# Patient Record
Sex: FEMALE | Race: WHITE | Employment: OTHER | URBAN - METROPOLITAN AREA
[De-identification: names, ages, dates, MRNs, and addresses within clinical notes are randomized per-mention and may not be internally consistent; named-entity substitution may affect disease eponyms.]

---

## 2017-05-02 DIAGNOSIS — K21.9 GASTROESOPHAGEAL REFLUX DISEASE WITHOUT ESOPHAGITIS: ICD-10-CM

## 2017-05-02 NOTE — TELEPHONE ENCOUNTER
Omeprazole      Last Written Prescription Date: 05/09/16  Last Fill Quantity: 90,  # refills: 3   Last Office Visit with G, [facility] or [facility] prescribing provider: 05/09/16                                         Next 5 appointments (look out 90 days)     May 11, 2017  7:20 AM CDT   PHYSICAL with Mouna Chaves MD   [facility] ([facility])    [address] [place]  [facility] [id number] [phone]

## 2017-05-11 ENCOUNTER — OFFICE VISIT (OUTPATIENT)
Dept: INTERNAL MEDICINE | Facility: CLINIC | Age: 67
End: 2017-05-11
Payer: COMMERCIAL

## 2017-05-11 VITALS
WEIGHT: 198.3 LBS | OXYGEN SATURATION: 98 % | TEMPERATURE: 98.6 F | HEART RATE: 72 BPM | HEIGHT: 66 IN | DIASTOLIC BLOOD PRESSURE: 78 MMHG | SYSTOLIC BLOOD PRESSURE: 134 MMHG | BODY MASS INDEX: 31.87 KG/M2

## 2017-05-11 DIAGNOSIS — E03.9 ACQUIRED HYPOTHYROIDISM: ICD-10-CM

## 2017-05-11 DIAGNOSIS — K91.2 POSTSURGICAL MALABSORPTION, NOT ELSEWHERE CLASSIFIED: ICD-10-CM

## 2017-05-11 DIAGNOSIS — Z12.31 ENCOUNTER FOR SCREENING MAMMOGRAM FOR MALIGNANT NEOPLASM OF BREAST: ICD-10-CM

## 2017-05-11 DIAGNOSIS — K21.9 GASTROESOPHAGEAL REFLUX DISEASE WITHOUT ESOPHAGITIS: ICD-10-CM

## 2017-05-11 DIAGNOSIS — Z98.84 BARIATRIC SURGERY STATUS: ICD-10-CM

## 2017-05-11 DIAGNOSIS — I10 ESSENTIAL HYPERTENSION: ICD-10-CM

## 2017-05-11 DIAGNOSIS — E21.3 HYPERPARATHYROIDISM (H): Primary | ICD-10-CM

## 2017-05-11 DIAGNOSIS — Z00.00 ROUTINE GENERAL MEDICAL EXAMINATION AT A HEALTH CARE FACILITY: ICD-10-CM

## 2017-05-11 DIAGNOSIS — Z23 NEED FOR PNEUMOCOCCAL VACCINATION: ICD-10-CM

## 2017-05-11 DIAGNOSIS — M10.9 GOUT WITHOUT TOPHUS: ICD-10-CM

## 2017-05-11 LAB
ALBUMIN SERPL-MCNC: 3.8 G/DL (ref 3.4–5)
ALP SERPL-CCNC: 120 U/L (ref 40–150)
ALT SERPL W P-5'-P-CCNC: 32 U/L (ref 0–50)
ANION GAP SERPL CALCULATED.3IONS-SCNC: 5 MMOL/L (ref 3–14)
AST SERPL W P-5'-P-CCNC: 15 U/L (ref 0–45)
BILIRUB SERPL-MCNC: 0.7 MG/DL (ref 0.2–1.3)
BUN SERPL-MCNC: 25 MG/DL (ref 7–30)
CALCIUM SERPL-MCNC: 9.1 MG/DL (ref 8.5–10.1)
CHLORIDE SERPL-SCNC: 111 MMOL/L (ref 94–109)
CHOLEST SERPL-MCNC: 189 MG/DL
CO2 SERPL-SCNC: 28 MMOL/L (ref 20–32)
CREAT SERPL-MCNC: 1.38 MG/DL (ref 0.52–1.04)
ERYTHROCYTE [DISTWIDTH] IN BLOOD BY AUTOMATED COUNT: 13.1 % (ref 10–15)
GFR SERPL CREATININE-BSD FRML MDRD: 38 ML/MIN/1.7M2
GLUCOSE SERPL-MCNC: 94 MG/DL (ref 70–99)
HCT VFR BLD AUTO: 41.9 % (ref 35–47)
HDLC SERPL-MCNC: 60 MG/DL
HGB BLD-MCNC: 13.7 G/DL (ref 11.7–15.7)
LDLC SERPL CALC-MCNC: 113 MG/DL
MCH RBC QN AUTO: 32.9 PG (ref 26.5–33)
MCHC RBC AUTO-ENTMCNC: 32.7 G/DL (ref 31.5–36.5)
MCV RBC AUTO: 101 FL (ref 78–100)
NONHDLC SERPL-MCNC: 129 MG/DL
PLATELET # BLD AUTO: 199 10E9/L (ref 150–450)
POTASSIUM SERPL-SCNC: 4.3 MMOL/L (ref 3.4–5.3)
PROT SERPL-MCNC: 7.4 G/DL (ref 6.8–8.8)
RBC # BLD AUTO: 4.17 10E12/L (ref 3.8–5.2)
SODIUM SERPL-SCNC: 144 MMOL/L (ref 133–144)
TRIGL SERPL-MCNC: 82 MG/DL
TSH SERPL DL<=0.005 MIU/L-ACNC: 1.99 MU/L (ref 0.4–4)
WBC # BLD AUTO: 5.8 10E9/L (ref 4–11)

## 2017-05-11 PROCEDURE — 86803 HEPATITIS C AB TEST: CPT | Performed by: INTERNAL MEDICINE

## 2017-05-11 PROCEDURE — 80061 LIPID PANEL: CPT | Performed by: INTERNAL MEDICINE

## 2017-05-11 PROCEDURE — G0009 ADMIN PNEUMOCOCCAL VACCINE: HCPCS | Performed by: INTERNAL MEDICINE

## 2017-05-11 PROCEDURE — 90732 PPSV23 VACC 2 YRS+ SUBQ/IM: CPT | Performed by: INTERNAL MEDICINE

## 2017-05-11 PROCEDURE — 85027 COMPLETE CBC AUTOMATED: CPT | Performed by: INTERNAL MEDICINE

## 2017-05-11 PROCEDURE — 80053 COMPREHEN METABOLIC PANEL: CPT | Performed by: INTERNAL MEDICINE

## 2017-05-11 PROCEDURE — G0438 PPPS, INITIAL VISIT: HCPCS | Performed by: INTERNAL MEDICINE

## 2017-05-11 PROCEDURE — 84443 ASSAY THYROID STIM HORMONE: CPT | Performed by: INTERNAL MEDICINE

## 2017-05-11 PROCEDURE — 36415 COLL VENOUS BLD VENIPUNCTURE: CPT | Performed by: INTERNAL MEDICINE

## 2017-05-11 RX ORDER — CYANOCOBALAMIN 1000 UG/ML
INJECTION, SOLUTION INTRAMUSCULAR; SUBCUTANEOUS
Qty: 3 ML | Refills: 3 | Status: CANCELLED | OUTPATIENT
Start: 2017-05-11

## 2017-05-11 RX ORDER — CYANOCOBALAMIN 1000 UG/ML
INJECTION, SOLUTION INTRAMUSCULAR; SUBCUTANEOUS
Qty: 3 ML | Refills: 1 | Status: SHIPPED | OUTPATIENT
Start: 2017-05-11 | End: 2018-02-06

## 2017-05-11 RX ORDER — ALLOPURINOL 100 MG/1
200 TABLET ORAL DAILY
Qty: 180 TABLET | Refills: 1 | Status: SHIPPED | OUTPATIENT
Start: 2017-05-11 | End: 2018-01-03

## 2017-05-11 RX ORDER — LEVOTHYROXINE SODIUM 100 UG/1
100 TABLET ORAL DAILY
Qty: 90 TABLET | Refills: 3 | Status: SHIPPED | OUTPATIENT
Start: 2017-05-11 | End: 2018-05-06

## 2017-05-11 NOTE — PROGRESS NOTES
SUBJECTIVE:                                                            Amy Haq is a 66 year old female who presents for Preventive Visit.    Fasting physical.  Last pap hyst. Last mammo 5-2016 wnl. Last Dexa 5-2016 osteopenia.  Last colonoscopy 4-2013 polyps.    Are you in the first 12 months of your Medicare coverage?  No    Physical   Annual:     Getting at least 3 servings of Calcium per day::  NO    Bi-annual eye exam::  Yes    Dental care twice a year::  Yes    Sleep apnea or symptoms of sleep apnea::  None    Frequency of exercise::  4-5 days/week    Duration of exercise::  45-60 minutes    Taking medications regularly::  Yes    Medication side effects::  None    Additional concerns today::  YES      COGNITIVE SCREEN  1) Repeat 3 items (Banana, Sunrise, Chair)    2) Clock draw: NORMAL  3) 3 item recall: Recalls 3 objects  Results: 3 items recalled: COGNITIVE IMPAIRMENT LESS LIKELY    Mini-CogTM Copyright S Vandana. Licensed by the author for use in [facility]; reprinted with permission (hany@[facility]). All rights reserved.        Reviewed and updated as needed this visit by clinical staff  Tobacco  Allergies  Meds  Med Hx  Surg Hx  Fam Hx  Soc Hx        Reviewed and updated as needed this visit by Provider        Social History   Substance Use Topics     Smoking status: Former Smoker     Packs/day: 0.50     Years: 35.00     Types: Cigarettes     Quit date: 9/26/2006     Smokeless tobacco: Never Used     Alcohol use Yes      Comment: occasionally       The patient does not drink >3 drinks per day nor >7 drinks per week.        Today's PHQ-2 Score:   PHQ-2 ( 1999 [employer]) 5/8/2017   Q1: Little interest or pleasure in doing things -   Q2: Feeling down, depressed or hopeless -   PHQ-2 Score -   Little interest or pleasure in doing things Not at all   Feeling down, depressed or hopeless Not at all   PHQ-2 Score 0       Do you feel safe in your environment - Yes    Do you have a Health Care  Directive?: No: Advance care planning reviewed with patient; information given to patient to review.    Current providers sharing in care for this patient include:   Patient Care Team:  Mouna Chaves MD as PCP - General      Hearing impairment: No    Ability to successfully perform activities of daily living: Yes, no assistance needed     Fall risk:  Fallen 2 or more times in the past year?: No  Any fall with injury in the past year?: No    Home safety:  none identified      The following health maintenance items are reviewed in Epic and correct as of today:  Health Maintenance   Topic Date Due     HEPATITIS C SCREENING  08/10/1968     PNEUMOCOCCAL (2 of 2 - PPSV23) 10/12/2016     ADVANCE DIRECTIVE PLANNING Q5 YRS (NO INBASKET)  02/16/2017     TSH Q1 YEAR (NO INBASKET)  05/09/2017     FALL RISK ASSESSMENT  05/09/2017     INFLUENZA VACCINE (SYSTEM ASSIGNED)  09/01/2017     MAMMO SCREEN Q2 YR (SYSTEM ASSIGNED)  05/25/2018     TETANUS IMMUNIZATION (SYSTEM ASSIGNED)  10/30/2018     DEXA Q3 YR  05/25/2019     LIPID SCREEN Q5 YR FEMALE (SYSTEM ASSIGNED)  05/09/2021     COLON CANCER SCREEN (SYSTEM ASSIGNED)  04/30/2023              ROS:  C: NEGATIVE for fever, chills, change in weight  I: NEGATIVE for worrisome rashes, moles or lesions  E: NEGATIVE for vision changes or irritation  E/M: NEGATIVE for ear, mouth and throat problems  R: NEGATIVE for significant cough or SOB  B: NEGATIVE for masses, tenderness or discharge  CV: NEGATIVE for chest pain, palpitations or peripheral edema  GI: NEGATIVE for nausea, abdominal pain, heartburn, or change in bowel habits  : NEGATIVE for frequency, dysuria, or hematuria  M: NEGATIVE for significant arthralgias or myalgia  N: NEGATIVE for weakness, dizziness or paresthesias  E: NEGATIVE for temperature intolerance, skin/hair changes  H: NEGATIVE for bleeding problems  P: NEGATIVE for changes in mood or affect    Problem list, Medication list, Allergies, and  "Medical/Social/Surgical histories reviewed in [facility] and updated as appropriate.  OBJECTIVE:                                                            /78 (BP Location: Right arm, Patient Position: Chair, Cuff Size: Adult Large)  Pulse 72  Temp 98.6  F (37  C) (Oral)  Ht 5' 5.75\" (1.67 m)  Wt 198 lb 4.8 oz (89.9 kg)  SpO2 98%  Breastfeeding? No  BMI 32.25 kg/m2 Estimated body mass index is 32.25 kg/(m^2) as calculated from the following:    Height as of this encounter: 5' 5.75\" (1.67 m).    Weight as of this encounter: 198 lb 4.8 oz (89.9 kg).  EXAM:   GENERAL: healthy, alert and no distress  EYES: Eyes grossly normal to inspection, PERRL and conjunctivae and sclerae normal  HENT: ear canals and TM's normal, nose and mouth without ulcers or lesions  NECK: no adenopathy, no asymmetry, masses, or scars and thyroid normal to palpation  RESP: lungs clear to auscultation - no rales, rhonchi or wheezes  BREAST: normal without masses, tenderness or nipple discharge and no palpable axillary masses or adenopathy  CV: regular rate and rhythm, normal S1 S2, no S3 or S4, no murmur, click or rub, no peripheral edema and peripheral pulses strong  ABDOMEN: soft, nontender, no hepatosplenomegaly, no masses and bowel sounds normal  MS: no gross musculoskeletal defects noted, no edema  SKIN: no suspicious lesions or rashes  NEURO: Normal strength and tone, mentation intact and speech normal  PSYCH: mentation appears normal, affect normal/bright    ASSESSMENT / PLAN:                                                            1. Routine general medical examination at a health care facility     - CBC with platelets  - Comprehensive metabolic panel (BMP + Alb, Alk Phos, ALT, AST, Total. Bili, TP)  - TSH with free T4 reflex  - Lipid Profile with reflex to direct LDL  - Hepatitis C Screen Reflex to HCV RNA Quant and Genotype  - *MA Screening Digital Bilateral; Future    2. Hyperparathyroidism (H)       3. Acquired " "hypothyroidism     - levothyroxine (LEVOTHROID) 100 MCG tablet; Take 1 tablet (100 mcg) by mouth daily  Dispense: 90 tablet; Refill: 3    4. Bariatric surgery status     - syringe/needle, disp, 25G X 1\" 3 ML MISC; Use for B-12 injection  Dispense: 3 each; Refill: 3  - ranitidine (ZANTAC) 150 MG tablet; Take 1 tablet (150 mg) by mouth 2 times daily  Dispense: 180 tablet; Refill: 3    5. Essential hypertension       6. Postsurgical malabsorption, not elsewhere classified     - cyanocobalamin (VITAMIN B12) 1000 MCG/ML injection; INJECT 1 ML (1,000 MCG) SUBCUTANEOUS EVERY 45 days  Dispense: 3 mL; Refill: 1    7. Gastroesophageal reflux disease without esophagitis     - ranitidine (ZANTAC) 150 MG tablet; Take 1 tablet (150 mg) by mouth 2 times daily  Dispense: 180 tablet; Refill: 3    8. Gout without tophus     - allopurinol (ZYLOPRIM) 100 MG tablet; Take 2 tablets (200 mg) by mouth daily  Dispense: 180 tablet; Refill: 1    9. Encounter for screening mammogram for malignant neoplasm of breast      - *MA Screening Digital Bilateral; Future    End of Life Planning:  Patient currently has an advanced directive: No.  I have verified the patient's ablity to prepare an advanced directive/make health care decisions.  Literature was provided to assist patient in preparing an advanced directive.    COUNSELING:  Reviewed preventive health counseling, as reflected in patient instructions       Regular exercise       Healthy diet/nutrition    BP Screening:   Last 3 BP Readings:    BP Readings from Last 3 Encounters:   05/11/17 134/78   12/07/16 153/84   05/09/16 144/78       The following was recommended to the patient:  Re-screen BP within a year and recommended lifestyle modifications    Estimated body mass index is 32.25 kg/(m^2) as calculated from the following:    Height as of this encounter: 5' 5.75\" (1.67 m).    Weight as of this encounter: 198 lb 4.8 oz (89.9 kg).  Weight management plan: Discussed healthy diet and exercise " guidelines and patient will follow up in 12 months in clinic to re-evaluate.   reports that she quit smoking about 10 years ago. Her smoking use included Cigarettes. She has a 17.50 pack-year smoking history. She has never used smokeless tobacco.      Appropriate preventive services were discussed with this patient, including applicable screening as appropriate for cardiovascular disease, diabetes, osteopenia/osteoporosis, and glaucoma.  As appropriate for age/gender, discussed screening for colorectal cancer, prostate cancer, breast cancer, and cervical cancer. Checklist reviewing preventive services available has been given to the patient.    Reviewed patients plan of care and provided an AVS. The Basic Care Plan (routine screening as documented in Health Maintenance) for Amy meets the Care Plan requirement. This Care Plan has been established and reviewed with the Patient.    Counseling Resources:  ATP IV Guidelines  Pooled Cohorts Equation Calculator  Breast Cancer Risk Calculator  FRAX Risk Assessment  ICSI Preventive Guidelines  Dietary Guidelines for Americans, 2010  [employer]'s MyPlate  ASA Prophylaxis  Lung CA Screening    Mouna Chaves MD  [facility]  Answers for HPI/ROS submitted by the patient on 5/8/2017   Q1: Little interest or pleasure in doing things: 0=Not at all  Q2: Feeling down, depressed or hopeless: 0=Not at all  PHQ-2 Score: 0

## 2017-05-11 NOTE — MR AVS SNAPSHOT
After Visit Summary   5/11/2017    Amy Haq    MRN: [id number]           Patient Information     Date Of Birth          1950        Visit Information        Provider Department      5/11/2017 7:20 AM Mouna Chaves MD [facility]        Today's Diagnoses     Hyperparathyroidism (H)    -  1    Routine general medical examination at a health care facility        Acquired hypothyroidism        Bariatric surgery status        Essential hypertension        Postsurgical malabsorption, not elsewhere classified        Gastroesophageal reflux disease without esophagitis        Gout without tophus        Encounter for screening mammogram for malignant neoplasm of breast            Follow-ups after your visit        Future tests that were ordered for you today     Open Future Orders        Priority Expected Expires Ordered    *MA Screening Digital Bilateral Routine  5/11/2018 5/11/2017            Who to contact     If you have questions or need follow up information about today's clinic visit or your schedule please contact [facility] directly at [phone].  Normal or non-critical lab and imaging results will be communicated to you by MyChart, letter or phone within 4 business days after the clinic has received the results. If you do not hear from us within 7 days, please contact the clinic through [employer] or phone. If you have a critical or abnormal lab result, we will notify you by phone as soon as possible.  Submit refill requests through [employer] or call your pharmacy and they will forward the refill request to us. Please allow 3 business days for your refill to be completed.          Additional Information About Your Visit        MyChart Information     [employer] gives you secure access to your electronic health record. If you see a primary care provider, you can also send messages to your care team and make appointments. If you have questions, please call  "your primary care clinic.  If you do not have a primary care provider, please call [phone] and they will assist you.        Care EveryWhere ID     This is your Care EveryWhere ID. This could be used by other organizations to access your [place] medical records  [id number]        Your Vitals Were     Pulse Temperature Height Pulse Oximetry Breastfeeding? BMI (Body Mass Index)    72 98.6  F (37  C) (Oral) 5' 5.75\" (1.67 m) 98% No 32.25 kg/m2       Blood Pressure from Last 3 Encounters:   05/11/17 134/78   12/07/16 153/84   05/09/16 144/78    Weight from Last 3 Encounters:   05/11/17 198 lb 4.8 oz (89.9 kg)   12/07/16 194 lb 8 oz (88.2 kg)   05/09/16 190 lb (86.2 kg)              We Performed the Following     CBC with platelets     Comprehensive metabolic panel (BMP + Alb, Alk Phos, ALT, AST, Total. Bili, TP)     Hepatitis C Screen Reflex to HCV RNA Quant and Genotype     Lipid Profile with reflex to direct LDL     TSH with free T4 reflex          Today's Medication Changes          These changes are accurate as of: 5/11/17  8:03 AM.  If you have any questions, ask your nurse or doctor.               These medicines have changed or have updated prescriptions.        Dose/Directions    cyanocobalamin 1000 MCG/ML injection   Commonly known as:  VITAMIN B12   This may have changed:  See the new instructions.   Used for:  Postsurgical malabsorption, not elsewhere classified   Changed by:  Mouna Chaves MD        INJECT 1 ML (1,000 MCG) SUBCUTANEOUS EVERY 45 days   Quantity:  3 mL   Refills:  1            Where to get your medicines      These medications were sent to [id number] - [place], [place] - [address] [address] [address]     Phone:  [phone]     allopurinol 100 MG tablet    cyanocobalamin 1000 MCG/ML injection    levothyroxine 100 MCG tablet    ranitidine 150 MG tablet    syringe/needle (disp) 25G X 1\" 3 ML Misc                Primary Care Provider Office Phone " "# Fax #    Mouna Chaves [phone]       [facility] [address]   [facility] [address]        Thank you!     Thank you for choosing [facility]  for your care. Our goal is always to provide you with excellent care. Hearing back from our patients is one way we can continue to improve our services. Please take a few minutes to complete the written survey that you may receive in the mail after your visit with us. Thank you!             Your Updated Medication List - Protect others around you: Learn how to safely use, store and throw away your medicines at www.disposemymeds.org.          This list is accurate as of: 5/11/17  8:03 AM.  Always use your most recent med list.                   Brand Name Dispense Instructions for use    allopurinol 100 MG tablet    ZYLOPRIM    180 tablet    Take 2 tablets (200 mg) by mouth daily       ASPIR-LOW PO      Take 81 mg by mouth daily       CALCIUM + D PO      Take 2 tablets by mouth daily 600mg + vitamin D       cetirizine 10 MG tablet    zyrTEC     Take 10 mg by mouth daily.       cyanocobalamin 1000 MCG/ML injection    VITAMIN B12    3 mL    INJECT 1 ML (1,000 MCG) SUBCUTANEOUS EVERY 45 days       levothyroxine 100 MCG tablet    LEVOTHROID    90 tablet    Take 1 tablet (100 mcg) by mouth daily       * Multi-vitamin Tabs tablet      Take 1 tablet by mouth daily       * ICAPS AREDS FORMULA PO      Take 1 capsule by mouth daily       nystatin-triamcinolone cream    MYCOLOG II    30 g    Apply to affected area twice daily up to 7 days as needed for rash       ranitidine 150 MG tablet    ZANTAC    180 tablet    Take 1 tablet (150 mg) by mouth 2 times daily       syringe/needle (disp) 25G X 1\" 3 ML Misc     3 each    Use for B-12 injection       VITAMIN D (CHOLECALCIFEROL) PO      Take 2,000 Units by mouth daily       * Notice:  This list has 2 medication(s) that are the same as other medications prescribed for you. " Read the directions carefully, and ask your doctor or other care provider to review them with you.

## 2017-05-11 NOTE — NURSING NOTE
"Chief Complaint   Patient presents with     Medicare Visit       Initial /78 (BP Location: Right arm, Patient Position: Chair, Cuff Size: Adult Large)  Pulse 72  Temp 98.6  F (37  C) (Oral)  Ht 5' 5.75\" (1.67 m)  Wt 198 lb 4.8 oz (89.9 kg)  SpO2 98%  Breastfeeding? No  BMI 32.25 kg/m2 Estimated body mass index is 32.25 kg/(m^2) as calculated from the following:    Height as of this encounter: 5' 5.75\" (1.67 m).    Weight as of this encounter: 198 lb 4.8 oz (89.9 kg).  Medication Reconciliation: complete    "

## 2017-05-15 LAB — HCV AB SERPL QL IA: NORMAL

## 2017-10-19 ENCOUNTER — E-VISIT (OUTPATIENT)
Dept: INTERNAL MEDICINE | Facility: CLINIC | Age: 67
End: 2017-10-19
Payer: COMMERCIAL

## 2017-10-19 DIAGNOSIS — N30.00 ACUTE CYSTITIS WITHOUT HEMATURIA: Primary | ICD-10-CM

## 2017-10-19 DIAGNOSIS — N18.30 CKD (CHRONIC KIDNEY DISEASE) STAGE 3, GFR 30-59 ML/MIN (H): ICD-10-CM

## 2017-10-19 DIAGNOSIS — N30.00 ACUTE CYSTITIS WITHOUT HEMATURIA: ICD-10-CM

## 2017-10-19 LAB
ALBUMIN UR-MCNC: 30 MG/DL
APPEARANCE UR: CLEAR
BILIRUB UR QL STRIP: NEGATIVE
COLOR UR AUTO: YELLOW
GLUCOSE UR STRIP-MCNC: NEGATIVE MG/DL
HGB UR QL STRIP: ABNORMAL
KETONES UR STRIP-MCNC: NEGATIVE MG/DL
LEUKOCYTE ESTERASE UR QL STRIP: ABNORMAL
NITRATE UR QL: NEGATIVE
PH UR STRIP: 5.5 PH (ref 5–7)
RBC #/AREA URNS AUTO: ABNORMAL /HPF
SOURCE: ABNORMAL
SP GR UR STRIP: 1.01 (ref 1–1.03)
UROBILINOGEN UR STRIP-ACNC: 0.2 EU/DL (ref 0.2–1)
WBC #/AREA URNS AUTO: ABNORMAL /HPF

## 2017-10-19 PROCEDURE — 87186 SC STD MICRODIL/AGAR DIL: CPT | Mod: 91 | Performed by: INTERNAL MEDICINE

## 2017-10-19 PROCEDURE — 87086 URINE CULTURE/COLONY COUNT: CPT | Performed by: INTERNAL MEDICINE

## 2017-10-19 PROCEDURE — 99444 ZZC PHYSICIAN ONLINE EVALUATION & MANAGEMENT SERVICE: CPT | Performed by: INTERNAL MEDICINE

## 2017-10-19 PROCEDURE — 81001 URINALYSIS AUTO W/SCOPE: CPT | Performed by: INTERNAL MEDICINE

## 2017-10-19 PROCEDURE — 87088 URINE BACTERIA CULTURE: CPT | Performed by: INTERNAL MEDICINE

## 2017-10-19 RX ORDER — CIPROFLOXACIN 250 MG/1
250 TABLET, FILM COATED ORAL 2 TIMES DAILY
Qty: 6 TABLET | Refills: 0 | Status: SHIPPED | OUTPATIENT
Start: 2017-10-19 | End: 2018-05-16

## 2017-10-19 NOTE — TELEPHONE ENCOUNTER
Creatinine   Date Value Ref Range Status   05/11/2017 1.38 (H) 0.52 - 1.04 mg/dL Final       Cipro 250 bid

## 2017-10-19 NOTE — MR AVS SNAPSHOT
After Visit Summary   10/19/2017    Amy Haq    MRN: [id number]           Patient Information     Date Of Birth          1950        Visit Information        Provider Department      10/19/2017 8:49 AM Mouna Chaves MD [facility]        Today's Diagnoses     Acute cystitis without hematuria    -  1    CKD (chronic kidney disease) stage 3, GFR 30-59 ml/min           Follow-ups after your visit        Future tests that were ordered for you today     Open Future Orders        Priority Expected Expires Ordered    UA with Microscopic reflex to Culture Routine  10/19/2018 10/19/2017            Who to contact     If you have questions or need follow up information about today's clinic visit or your schedule please contact [facility] directly at [phone].  Normal or non-critical lab and imaging results will be communicated to you by MyChart, letter or phone within 4 business days after the clinic has received the results. If you do not hear from us within 7 days, please contact the clinic through [employer] or phone. If you have a critical or abnormal lab result, we will notify you by phone as soon as possible.  Submit refill requests through [employer] or call your pharmacy and they will forward the refill request to us. Please allow 3 business days for your refill to be completed.          Additional Information About Your Visit        MyChart Information     [employer] gives you secure access to your electronic health record. If you see a primary care provider, you can also send messages to your care team and make appointments. If you have questions, please call your primary care clinic.  If you do not have a primary care provider, please call [phone] and they will assist you.        Care EveryWhere ID     This is your Care EveryWhere ID. This could be used by other organizations to access your [place] medical records  [id number]         Blood Pressure  from Last 3 Encounters:   05/11/17 134/78   12/07/16 153/84   05/09/16 144/78    Weight from Last 3 Encounters:   05/11/17 198 lb 4.8 oz (89.9 kg)   12/07/16 194 lb 8 oz (88.2 kg)   05/09/16 190 lb (86.2 kg)                 Today's Medication Changes          These changes are accurate as of: 10/19/17  9:13 AM.  If you have any questions, ask your nurse or doctor.               Start taking these medicines.        Dose/Directions    ciprofloxacin 250 MG tablet   Commonly known as:  CIPRO   Used for:  Acute cystitis without hematuria, CKD (chronic kidney disease) stage 3, GFR 30-59 ml/min        Dose:  250 mg   Take 1 tablet (250 mg) by mouth 2 times daily   Quantity:  6 tablet   Refills:  0            Where to get your medicines      These medications were sent to [id number] - [facility] [address] [address] [address]     Phone:  [phone]     ciprofloxacin 250 MG tablet                Primary Care Provider Office Phone # Fax #    Mouna Chaves [phone]       [address] [facility] [address]  [facility] [address]        Equal Access to Services     HONORIO BELL AH: Hadii franca jean-baptiste hadasho Soomaali, waaxda luqadaha, qaybta kaalmada adeegyada, iris rico. So [facility] [phone].    ATENCIÓN: Si habla español, tiene a luis disposición servicios gratuitos de asistencia lingüística. Fishame al [phone].    We comply with applicable federal civil rights laws and [place] laws. We do not discriminate on the basis of race, color, national origin, age, disability, sex, sexual orientation, or gender identity.            Thank you!     Thank you for choosing [facility]  for your care. Our goal is always to provide you with excellent care. Hearing back from our patients is one way we can continue to improve our services. Please take a few minutes to complete the written survey that you may receive in the mail after your  "visit with us. Thank you!             Your Updated Medication List - Protect others around you: Learn how to safely use, store and throw away your medicines at www.disposemymeds.org.          This list is accurate as of: 10/19/17  9:13 AM.  Always use your most recent med list.                   Brand Name Dispense Instructions for use Diagnosis    allopurinol 100 MG tablet    ZYLOPRIM    180 tablet    Take 2 tablets (200 mg) by mouth daily    Gout without tophus       ASPIR-LOW PO      Take 81 mg by mouth daily    Status post bariatric surgery       CALCIUM + D PO      Take 2 tablets by mouth daily 600mg + vitamin D        cetirizine 10 MG tablet    zyrTEC     Take 10 mg by mouth daily.        ciprofloxacin 250 MG tablet    CIPRO    6 tablet    Take 1 tablet (250 mg) by mouth 2 times daily    Acute cystitis without hematuria, CKD (chronic kidney disease) stage 3, GFR 30-59 ml/min       cyanocobalamin 1000 MCG/ML injection    VITAMIN B12    3 mL    INJECT 1 ML (1,000 MCG) SUBCUTANEOUS EVERY 45 days    Postsurgical malabsorption, not elsewhere classified       levothyroxine 100 MCG tablet    LEVOTHROID    90 tablet    Take 1 tablet (100 mcg) by mouth daily    Acquired hypothyroidism       * Multi-vitamin Tabs tablet      Take 1 tablet by mouth daily        * ICAPS AREDS FORMULA PO      Take 1 capsule by mouth daily    Bariatric surgery status, Obesity (BMI 30.0-34.9)       nystatin-triamcinolone cream    MYCOLOG II    30 g    Apply to affected area twice daily up to 7 days as needed for rash    Bariatric surgery status, Postoperative malabsorption, Intertrigo       ranitidine 150 MG tablet    ZANTAC    180 tablet    Take 1 tablet (150 mg) by mouth 2 times daily    Bariatric surgery status, Gastroesophageal reflux disease without esophagitis       syringe/needle (disp) 25G X 1\" 3 ML Misc     3 each    Use for B-12 injection    Bariatric surgery status       VITAMIN D (CHOLECALCIFEROL) PO      Take 2,000 Units by mouth " daily    Bariatric surgery status, Obesity, unspecified       * Notice:  This list has 2 medication(s) that are the same as other medications prescribed for you. Read the directions carefully, and ask your doctor or other care provider to review them with you.

## 2017-10-22 LAB
BACTERIA SPEC CULT: ABNORMAL
BACTERIA SPEC CULT: ABNORMAL
SPECIMEN SOURCE: ABNORMAL

## 2018-01-03 DIAGNOSIS — M10.9 GOUT WITHOUT TOPHUS: ICD-10-CM

## 2018-01-06 NOTE — TELEPHONE ENCOUNTER
Requested Prescriptions   Pending Prescriptions Disp Refills     allopurinol (ZYLOPRIM) 100 MG tablet [Pharmacy Med Name: Allopurinol Oral Tablet 100 MG] 180 tablet 0     Sig: TAKE TWO TABLETS BY MOUTH DAILY    Gout Agents Protocol Failed    1/3/2018 11:41 AM       Failed - Normal serum creatinine on file in the past 12 months    Recent Labs   Lab Test  05/11/17 0817   CR  1.38*            Passed - CBC on file in past 12 months    Recent Labs   Lab Test  05/11/17 0817   WBC  5.8   RBC  4.17   HGB  13.7   HCT  41.9   PLT  199            Passed - ALT on file in past 12 months    Recent Labs   Lab Test  05/11/17 0817   ALT  32            Passed - Uric acid greater than or equal to 6 on file in past 12 months    Recent Labs   Lab Test  05/26/11   0821   URIC  7.5     If level is 6mg/dL or greater, ok to refill one time and refer to provider.          Passed - Recent or future visit with authorizing provider's specialty    Patient had office visit in the last year or has a visit in the next 30 days with authorizing provider.  See chart review.              Passed - Patient is age 18 or older       Passed - No active pregnancy on record       Passed - No positive pregnancy test in past year      Routing refill request to provider for review/approval because:  Labs out of range:  Cr

## 2018-01-08 RX ORDER — ALLOPURINOL 100 MG/1
TABLET ORAL
Qty: 180 TABLET | Refills: 0 | Status: SHIPPED | OUTPATIENT
Start: 2018-01-08 | End: 2018-04-10

## 2018-02-06 DIAGNOSIS — K91.2 POSTOPERATIVE MALABSORPTION: Primary | ICD-10-CM

## 2018-02-12 RX ORDER — CYANOCOBALAMIN 1000 UG/ML
INJECTION, SOLUTION INTRAMUSCULAR; SUBCUTANEOUS
Qty: 2 ML | Refills: 0 | Status: SHIPPED | OUTPATIENT
Start: 2018-02-12 | End: 2018-05-16

## 2018-04-10 DIAGNOSIS — M10.9 GOUT WITHOUT TOPHUS: ICD-10-CM

## 2018-04-11 NOTE — TELEPHONE ENCOUNTER
"Requested Prescriptions   Pending Prescriptions Disp Refills     allopurinol (ZYLOPRIM) 100 MG tablet [Pharmacy Med Name: Allopurinol Oral Tablet 100 MG] 180 tablet 0     Sig: TAKE TWO TABLETS BY MOUTH DAILY    Gout Agents Protocol Failed    4/10/2018 10:35 AM       Failed - Uric acid greater than or equal to 6 on file in past 12 months    Recent Labs   Lab Test  05/26/11   0821   URIC  7.5     If level is 6mg/dL or greater, ok to refill one time and refer to provider.          Failed - Normal serum creatinine on file in the past 12 months    Recent Labs   Lab Test  05/11/17   0817   CR  1.38*            Passed - CBC on file in past 12 months    Recent Labs   Lab Test  05/11/17 0817   WBC  5.8   RBC  4.17   HGB  13.7   HCT  41.9   PLT  199            Passed - ALT on file in past 12 months    Recent Labs   Lab Test  05/11/17 0817   ALT  32            Passed - Recent (12 mo) or future (30 days) visit within the authorizing provider's specialty    Patient had office visit in the last 12 months or has a visit in the next 30 days with authorizing provider or within the authorizing provider's specialty.  See \"Patient Info\" tab in inbasket, or \"Choose Columns\" in Meds & Orders section of the refill encounter.           Passed - Patient is age 18 or older       Passed - No active pregnancy on record       Passed - No positive pregnancy test in past year        Routing refill request to provider for review/approval because:  Labs not current:  Uric acid last done 2011        "

## 2018-04-13 RX ORDER — ALLOPURINOL 100 MG/1
TABLET ORAL
Qty: 180 TABLET | Refills: 0 | Status: SHIPPED | OUTPATIENT
Start: 2018-04-13 | End: 2018-05-16

## 2018-04-14 DIAGNOSIS — Z98.84 BARIATRIC SURGERY STATUS: ICD-10-CM

## 2018-04-14 DIAGNOSIS — K21.9 GASTROESOPHAGEAL REFLUX DISEASE WITHOUT ESOPHAGITIS: ICD-10-CM

## 2018-05-06 DIAGNOSIS — E03.9 ACQUIRED HYPOTHYROIDISM: ICD-10-CM

## 2018-05-08 RX ORDER — LEVOTHYROXINE SODIUM 100 UG/1
TABLET ORAL
Qty: 90 TABLET | Refills: 0 | Status: SHIPPED | OUTPATIENT
Start: 2018-05-08 | End: 2018-05-18

## 2018-05-08 NOTE — TELEPHONE ENCOUNTER
"        Requested Prescriptions   Pending Prescriptions Disp Refills     levothyroxine (SYNTHROID/LEVOTHROID) 100 MCG tablet [Pharmacy Med Name: Levothyroxine Sodium Oral Tablet 100 MCG] 90 tablet 2     Sig: TAKE ONE TABLET BY MOUTH ONE TIME DAILY    Thyroid Protocol Passed    5/6/2018  7:01 AM       Passed - Patient is 12 years or older       Passed - Recent (12 mo) or future (30 days) visit within the authorizing provider's specialty    Patient had office visit in the last 12 months or has a visit in the next 30 days with authorizing provider or within the authorizing provider's specialty.  See \"Patient Info\" tab in inbasket, or \"Choose Columns\" in Meds & Orders section of the refill encounter.           Passed - Normal TSH on file in past 12 months    Recent Labs   Lab Test  05/11/17   0817   TSH  1.99             Passed - No active pregnancy on record    If patient is pregnant or has had a positive pregnancy test, please check TSH.         Passed - No positive pregnancy test in past 12 months    If patient is pregnant or has had a positive pregnancy test, please check TSH.            "

## 2018-05-16 ENCOUNTER — TELEPHONE (OUTPATIENT)
Dept: INTERNAL MEDICINE | Facility: CLINIC | Age: 68
End: 2018-05-16

## 2018-05-16 ENCOUNTER — OFFICE VISIT (OUTPATIENT)
Dept: INTERNAL MEDICINE | Facility: CLINIC | Age: 68
End: 2018-05-16
Payer: COMMERCIAL

## 2018-05-16 VITALS
HEART RATE: 67 BPM | OXYGEN SATURATION: 98 % | SYSTOLIC BLOOD PRESSURE: 128 MMHG | BODY MASS INDEX: 33.11 KG/M2 | DIASTOLIC BLOOD PRESSURE: 64 MMHG | TEMPERATURE: 98 F | WEIGHT: 206 LBS | RESPIRATION RATE: 12 BRPM | HEIGHT: 66 IN

## 2018-05-16 DIAGNOSIS — Z12.11 SPECIAL SCREENING FOR MALIGNANT NEOPLASMS, COLON: ICD-10-CM

## 2018-05-16 DIAGNOSIS — Z00.01 ENCOUNTER FOR GENERAL ADULT MEDICAL EXAMINATION WITH ABNORMAL FINDINGS: Primary | ICD-10-CM

## 2018-05-16 DIAGNOSIS — E55.9 VITAMIN D DEFICIENCY: ICD-10-CM

## 2018-05-16 DIAGNOSIS — M10.9 GOUT WITHOUT TOPHUS: ICD-10-CM

## 2018-05-16 DIAGNOSIS — K91.2 POSTOPERATIVE MALABSORPTION: ICD-10-CM

## 2018-05-16 DIAGNOSIS — Z12.31 ENCOUNTER FOR SCREENING MAMMOGRAM FOR BREAST CANCER: ICD-10-CM

## 2018-05-16 DIAGNOSIS — Z98.84 BARIATRIC SURGERY STATUS: ICD-10-CM

## 2018-05-16 DIAGNOSIS — K21.9 GASTROESOPHAGEAL REFLUX DISEASE WITHOUT ESOPHAGITIS: ICD-10-CM

## 2018-05-16 DIAGNOSIS — E03.9 ACQUIRED HYPOTHYROIDISM: ICD-10-CM

## 2018-05-16 DIAGNOSIS — E03.9 HYPOTHYROIDISM, UNSPECIFIED TYPE: ICD-10-CM

## 2018-05-16 LAB
BASOPHILS # BLD AUTO: 0 10E9/L (ref 0–0.2)
BASOPHILS NFR BLD AUTO: 0.5 %
DIFFERENTIAL METHOD BLD: NORMAL
EOSINOPHIL # BLD AUTO: 0.1 10E9/L (ref 0–0.7)
EOSINOPHIL NFR BLD AUTO: 1.4 %
ERYTHROCYTE [DISTWIDTH] IN BLOOD BY AUTOMATED COUNT: 13.5 % (ref 10–15)
HCT VFR BLD AUTO: 39.6 % (ref 35–47)
HGB BLD-MCNC: 13 G/DL (ref 11.7–15.7)
LYMPHOCYTES # BLD AUTO: 1.4 10E9/L (ref 0.8–5.3)
LYMPHOCYTES NFR BLD AUTO: 21.8 %
MCH RBC QN AUTO: 32.5 PG (ref 26.5–33)
MCHC RBC AUTO-ENTMCNC: 32.8 G/DL (ref 31.5–36.5)
MCV RBC AUTO: 99 FL (ref 78–100)
MONOCYTES # BLD AUTO: 0.5 10E9/L (ref 0–1.3)
MONOCYTES NFR BLD AUTO: 7.9 %
NEUTROPHILS # BLD AUTO: 4.3 10E9/L (ref 1.6–8.3)
NEUTROPHILS NFR BLD AUTO: 68.4 %
PLATELET # BLD AUTO: 222 10E9/L (ref 150–450)
RBC # BLD AUTO: 4 10E12/L (ref 3.8–5.2)
VIT B12 SERPL-MCNC: 768 PG/ML (ref 193–986)
WBC # BLD AUTO: 6.3 10E9/L (ref 4–11)

## 2018-05-16 PROCEDURE — 80053 COMPREHEN METABOLIC PANEL: CPT | Performed by: NURSE PRACTITIONER

## 2018-05-16 PROCEDURE — 85025 COMPLETE CBC W/AUTO DIFF WBC: CPT | Performed by: NURSE PRACTITIONER

## 2018-05-16 PROCEDURE — 99397 PER PM REEVAL EST PAT 65+ YR: CPT | Performed by: NURSE PRACTITIONER

## 2018-05-16 PROCEDURE — 80061 LIPID PANEL: CPT | Performed by: NURSE PRACTITIONER

## 2018-05-16 PROCEDURE — 84443 ASSAY THYROID STIM HORMONE: CPT | Performed by: NURSE PRACTITIONER

## 2018-05-16 PROCEDURE — 93000 ELECTROCARDIOGRAM COMPLETE: CPT | Performed by: NURSE PRACTITIONER

## 2018-05-16 PROCEDURE — 82607 VITAMIN B-12: CPT | Performed by: NURSE PRACTITIONER

## 2018-05-16 PROCEDURE — 84550 ASSAY OF BLOOD/URIC ACID: CPT | Performed by: NURSE PRACTITIONER

## 2018-05-16 PROCEDURE — 36415 COLL VENOUS BLD VENIPUNCTURE: CPT | Performed by: NURSE PRACTITIONER

## 2018-05-16 PROCEDURE — 82306 VITAMIN D 25 HYDROXY: CPT | Performed by: NURSE PRACTITIONER

## 2018-05-16 RX ORDER — ALLOPURINOL 100 MG/1
200 TABLET ORAL DAILY
Qty: 180 TABLET | Refills: 3 | Status: SHIPPED | OUTPATIENT
Start: 2018-05-16 | End: 2019-05-22

## 2018-05-16 RX ORDER — CYANOCOBALAMIN 1000 UG/ML
INJECTION, SOLUTION INTRAMUSCULAR; SUBCUTANEOUS
Qty: 2 ML | Refills: 3 | Status: SHIPPED | OUTPATIENT
Start: 2018-05-16 | End: 2019-05-07

## 2018-05-16 ASSESSMENT — ACTIVITIES OF DAILY LIVING (ADL)
CURRENT_FUNCTION: NO ASSISTANCE NEEDED
I_NEED_ASSISTANCE_FOR_THE_FOLLOWING_DAILY_ACTIVITIES:: NO ASSISTANCE IS NEEDED

## 2018-05-16 NOTE — PATIENT INSTRUCTIONS
Lab in [address]    Refill on medication     Colonoscopy - they will call you to schedule   Dr Logan Escobar was previous provider

## 2018-05-16 NOTE — PROGRESS NOTES
SUBJECTIVE:   Amy Haq is a 67 year old female who presents for Preventive Visit.      Are you in the first 12 months of your Medicare coverage?  No    Physical   Annual:     Getting at least 3 servings of Calcium per day::  NO    Bi-annual eye exam::  Yes    Dental care twice a year::  Yes    Sleep apnea or symptoms of sleep apnea::  None    Diet::  Regular (no restrictions)    Frequency of exercise::  4-5 days/week    Duration of exercise::  45-60 minutes    Taking medications regularly::  Yes    Medication side effects::  Not applicable    Ability to successfully perform activities of daily living: no assistance needed  Home Safety:  No safety concerns identified  Hearing Impairment: no hearing concerns        Fall risk:  Fallen 2 or more times in the past year?: No  Any fall with injury in the past year?: No    COGNITIVE SCREEN  1) Repeat 3 items (Banana, Sunrise, Chair)    2) Clock draw: NORMAL  3) 3 item recall: Recalls 3 objects  Results: 3 items recalled: COGNITIVE IMPAIRMENT LESS LIKELY    Mini-CogTM Copyright BRIGHT [employer]. Licensed by the author for use in [facility]; reprinted with permission (hany@[email or web address]). All rights reserved.        Reviewed and updated as needed this visit by clinical staff  Tobacco  Allergies  Meds  Med Hx  Surg Hx  Fam Hx  Soc Hx        Reviewed and updated as needed this visit by Provider  Allergies        Social History   Substance Use Topics     Smoking status: Former Smoker     Packs/day: 0.50     Years: 35.00     Types: Cigarettes     Quit date: 9/26/2006     Smokeless tobacco: Never Used     Alcohol use Yes      Comment: occasionally       Alcohol Use 5/16/2018   If you drink alcohol do you typically have greater than 3 drinks per day OR greater than 7 drinks per week? No               Today's PHQ-2 Score:   PHQ-2 ( 1999 [employer]) 5/16/2018   Q1: Little interest or pleasure in doing things 0   Q2: Feeling down, depressed or hopeless 0   PHQ-2 Score 0   Q1:  "Little interest or pleasure in doing things Not at all   Q2: Feeling down, depressed or hopeless Not at all   PHQ-2 Score 0       Do you feel safe in your environment - Yes    Do you have a Health Care Directive?: No: Advance care planning was reviewed with patient; patient declined at this time.    Current providers sharing in care for this patient include:   Patient Care Team:  Mouna Chaves MD as PCP - General    The following health maintenance items are reviewed in Epic and correct as of today:  Health Maintenance   Topic Date Due     ADVANCE DIRECTIVE PLANNING Q5 YRS  02/16/2017     TSH Q1 YEAR  05/11/2018     FALL RISK ASSESSMENT  05/11/2018     MAMMO SCREEN Q2 YR (SYSTEM ASSIGNED)  05/25/2018     INFLUENZA VACCINE (Season Ended) 09/01/2018     TETANUS IMMUNIZATION (SYSTEM ASSIGNED)  10/30/2018     DEXA Q3 YR  05/25/2019     LIPID SCREEN Q5 YR FEMALE (SYSTEM ASSIGNED)  05/11/2022     COLON CANCER SCREEN (SYSTEM ASSIGNED)  04/30/2023     PNEUMOCOCCAL  Completed     HEPATITIS C SCREENING  Completed         Mammogram Screening:     Review of Systems  Constitutional, HEENT, cardiovascular, pulmonary, GI, , musculoskeletal, neuro, skin, endocrine and psych systems are negative, except as otherwise noted.    OBJECTIVE:   /64 (BP Location: Left arm, Patient Position: Sitting, Cuff Size: Adult Large)  Pulse 67  Temp 98  F (36.7  C) (Oral)  Resp 12  Ht 5' 5.75\" (1.67 m)  Wt 206 lb (93.4 kg)  SpO2 98%  BMI 33.5 kg/m2 Estimated body mass index is 33.5 kg/(m^2) as calculated from the following:    Height as of this encounter: 5' 5.75\" (1.67 m).    Weight as of this encounter: 206 lb (93.4 kg).  Physical Exam  GENERAL: healthy, alert and no distress  EYES: Eyes grossly normal to inspection, PERRL and conjunctivae and sclerae normal  HENT: ear canals and TM's normal, nose and mouth without ulcers or lesions  NECK: no adenopathy, no asymmetry, masses, or scars and thyroid normal to palpation  RESP: " "lungs clear to auscultation - no rales, rhonchi or wheezes  CV: regular rate and rhythm, normal S1 S2, no S3 or S4, no murmur, click or rub, no peripheral edema and peripheral pulses strong  ABDOMEN: soft, nontender, no hepatosplenomegaly, no masses and bowel sounds normal  MS: no gross musculoskeletal defects noted, no edema  SKIN: no suspicious lesions or rashes  NEURO: Normal strength and tone, mentation intact and speech normal  PSYCH: mentation appears normal, affect normal/bright    ASSESSMENT / PLAN:   1. Encounter for general adult medical examination with abnormal findings    - Lipid panel reflex to direct LDL Fasting  - Comprehensive metabolic panel  - TSH with free T4 reflex  - CBC with platelets differential    2. Gout without tophus  Stable - no outbreaks   - allopurinol (ZYLOPRIM) 100 MG tablet; Take 2 tablets (200 mg) by mouth daily  Dispense: 180 tablet; Refill: 3  - Uric acid    3. Postoperative malabsorption    - cyanocobalamin (VITAMIN B12) 1000 MCG/ML injection; inject 1 ml subcutaneous every 45 days  Dispense: 2 mL; Refill: 3    4. Bariatric surgery status    - ranitidine (ZANTAC) 150 MG tablet; Take 1 tablet (150 mg) by mouth 2 times daily  Dispense: 180 tablet; Refill: 3  - syringe/needle, disp, 25G X 1\" 3 ML MISC; Use for B-12 injection  Dispense: 3 each; Refill: 3  - Vitamin B12    5. Gastroesophageal reflux disease without esophagitis    - ranitidine (ZANTAC) 150 MG tablet; Take 1 tablet (150 mg) by mouth 2 times daily  Dispense: 180 tablet; Refill: 3    6. Vitamin D deficiency    - Vitamin D Deficiency    7. Encounter for screening mammogram for breast cancer    - *MA Screening Digital Bilateral; Future    End of Life Planning:  Patient currently has an advanced directive: No.  I have verified the patient's ablity to prepare an advanced directive/make health care decisions.  Literature was provided to assist patient in preparing an advanced directive.    COUNSELING:  Reviewed preventive " "health counseling, as reflected in patient instructions       Regular exercise       Healthy diet/nutrition             Osteoporosis Prevention/Bone Health        Estimated body mass index is 33.5 kg/(m^2) as calculated from the following:    Height as of this encounter: 5' 5.75\" (1.67 m).    Weight as of this encounter: 206 lb (93.4 kg).  Weight management plan: Discussed healthy diet and exercise guidelines and patient will follow up in 12 months in clinic to re-evaluate.   reports that she quit smoking about 11 years ago. Her smoking use included Cigarettes. She has a 17.50 pack-year smoking history. She has never used smokeless tobacco.      Appropriate preventive services were discussed with this patient, including applicable screening as appropriate for cardiovascular disease, diabetes, osteopenia/osteoporosis, and glaucoma.  As appropriate for age/gender, discussed screening for colorectal cancer, prostate cancer, breast cancer, and cervical cancer. Checklist reviewing preventive services available has been given to the patient.    Reviewed patients plan of care and provided an AVS. The Basic Care Plan (routine screening as documented in Health Maintenance) for Amy meets the Care Plan requirement. This Care Plan has been established and reviewed with the Patient.    Counseling Resources:  ATP IV Guidelines  Pooled Cohorts Equation Calculator  Breast Cancer Risk Calculator  FRAX Risk Assessment  ICSI Preventive Guidelines  Dietary Guidelines for Americans, 2010  USDA's MyPlate  ASA Prophylaxis  Lung CA Screening    MOUNA Watkins [facility]  Answers for HPI/ROS submitted by the patient on 5/16/2018   PHQ-2 Score: 0    "

## 2018-05-16 NOTE — MR AVS SNAPSHOT
After Visit Summary   5/16/2018    Amy Haq    MRN: [id number]           Patient Information     Date Of Birth          1950        Visit Information        Provider Department      5/16/2018 8:40 AM Francisca Sainz APRN [facility]        Today's Diagnoses     Encounter for general adult medical examination with abnormal findings    -  1    Gout without tophus        Postoperative malabsorption        Bariatric surgery status        Gastroesophageal reflux disease without esophagitis        Vitamin D deficiency        Encounter for screening mammogram for breast cancer        Special screening for malignant neoplasms, colon          Care Instructions    Lab in [address]    Refill on medication     Colonoscopy - they will call you to schedule   Dr Logan Escobar was previous provider            Follow-ups after your visit        Additional Services     GASTROENTEROLOGY ADULT REF PROCEDURE ONLY [facility] [phone]; [facility] (Dr Escobar )       Last Lab Result: Creatinine (mg/dL)       Date                     Value                 05/11/2017               1.38 (H)         ----------  Body mass index is 33.5 kg/(m^2).     Needed:  No  Language:  English    Patient will be contacted to schedule procedure.     Please be aware that coverage of these services is subject to the terms and limitations of your health insurance plan.  Call member services at your health plan with any benefit or coverage questions.  Any procedures must be performed at a [place] facility OR coordinated by your clinic's referral office.    Please bring the following with you to your appointment:    (1) Any X-Rays, CTs or MRIs which have been performed.  Contact the facility where they were done to arrange for  prior to your scheduled appointment.    (2) List of current medications   (3) This referral request   (4) Any documents/labs given to you for this referral  "                 Your next 10 appointments already scheduled     May 22, 2018  8:40 AM CDT   [facility] C[facility]    [address]  [facility] [id number] [phone]              Future tests that were ordered for you today     Open Future Orders        Priority Expected Expires Ordered    *MA Screening Digital Bilateral Routine  5/16/2019 5/16/2018            Who to contact     If you have questions or need follow up information about today's clinic visit or your schedule please contact [facility] directly at [phone].  Normal or non-critical lab and imaging results will be communicated to you by MyChart, letter or phone within 4 business days after the clinic has received the results. If you do not hear from us within 7 days, please contact the clinic through [facility] or phone. If you have a critical or abnormal lab result, we will notify you by phone as soon as possible.  Submit refill requests through [facility] or call your pharmacy and they will forward the refill request to us. Please allow 3 business days for your refill to be completed.          Additional Information About Your Visit        [facility] Information     [facility] [facility] gives you secure access to your electronic health record. If you see a primary care provider, you can also send messages to your care team and make appointments. If you have questions, please call your primary care clinic.  If you do not have a primary care provider, please call [phone] and they will assist you.        Care EveryWhere ID     This is your Care EveryWhere ID. This could be used by other organizations to access your [place] medical records  [id number]        Your Vitals Were     Pulse Temperature Respirations Height Pulse Oximetry BMI (Body Mass Index)    67 98  F (36.7  C) (Oral) 12 5' 5.75\" (1.67 m) 98% 33.5 kg/m2       Blood Pressure from Last 3 " "Encounters:   05/16/18 128/64   05/11/17 134/78   12/07/16 153/84    Weight from Last 3 Encounters:   05/16/18 206 lb (93.4 kg)   05/11/17 198 lb 4.8 oz (89.9 kg)   12/07/16 194 lb 8 oz (88.2 kg)              We Performed the Following     CBC with platelets differential     Comprehensive metabolic panel     GASTROENTEROLOGY ADULT REF PROCEDURE ONLY [facility] [phone]; [facility] (Dr Escobar )     Lipid panel reflex to direct LDL Fasting     TSH with free T4 reflex     Uric acid     Vitamin B12     Vitamin D Deficiency          Today's Medication Changes          These changes are accurate as of 5/16/18  9:25 AM.  If you have any questions, ask your nurse or doctor.               These medicines have changed or have updated prescriptions.        Dose/Directions    allopurinol 100 MG tablet   Commonly known as:  ZYLOPRIM   This may have changed:  See the new instructions.   Used for:  Gout without tophus   Changed by:  Francisca Sainz APRN CNP        Dose:  200 mg   Take 2 tablets (200 mg) by mouth daily   Quantity:  180 tablet   Refills:  3       cyanocobalamin 1000 MCG/ML injection   Commonly known as:  VITAMIN B12   This may have changed:  See the new instructions.   Used for:  Postoperative malabsorption   Changed by:  Francisca Sainz APRN CNP        inject 1 ml subcutaneous every 45 days   Quantity:  2 mL   Refills:  3            Where to get your medicines      These medications were sent to [id number] [address] [address] [address]     Phone:  [phone]     allopurinol 100 MG tablet    cyanocobalamin 1000 MCG/ML injection    ranitidine 150 MG tablet    syringe/needle (disp) 25G X 1\" 3 ML Misc                Primary Care Provider Office Phone # Fax #    Mouna Chaves [phone]       [address] [facility] [address]  [facility] [address]        Equal Access to Services     HONORIO BELL AH: Daniel carpenter " Sojennifer, waalfonsoda luqadaha, qaybta kaalmada christofer, iris manrique taliaveronica escalantekaren trisha. So [facility] [phone].    ATENCIÓN: Si tapan machado, tiene a luis disposición servicios gratuitos de asistencia lingüística. Donya al [phone].    We comply with applicable federal civil rights laws and [place] laws. We do not discriminate on the basis of race, color, national origin, age, disability, sex, sexual orientation, or gender identity.            Thank you!     Thank you for choosing [facility]  for your care. Our goal is always to provide you with excellent care. Hearing back from our patients is one way we can continue to improve our services. Please take a few minutes to complete the written survey that you may receive in the mail after your visit with us. Thank you!             Your Updated Medication List - Protect others around you: Learn how to safely use, store and throw away your medicines at www.disposemymeds.org.          This list is accurate as of 5/16/18  9:25 AM.  Always use your most recent med list.                   Brand Name Dispense Instructions for use Diagnosis    allopurinol 100 MG tablet    ZYLOPRIM    180 tablet    Take 2 tablets (200 mg) by mouth daily    Gout without tophus       CALCIUM + D PO      Take 2 tablets by mouth daily 600mg + vitamin D        cetirizine 10 MG tablet    zyrTEC     Take 10 mg by mouth daily.        cyanocobalamin 1000 MCG/ML injection    VITAMIN B12    2 mL    inject 1 ml subcutaneous every 45 days    Postoperative malabsorption       ICAPS AREDS FORMULA PO      Take 1 capsule by mouth daily    Bariatric surgery status, Obesity (BMI 30.0-34.9)       levothyroxine 100 MCG tablet    SYNTHROID/LEVOTHROID    90 tablet    TAKE ONE TABLET BY MOUTH ONE TIME DAILY    Acquired hypothyroidism       ranitidine 150 MG tablet    ZANTAC    180 tablet    Take 1 tablet (150 mg) by mouth 2 times daily    Bariatric surgery status, Gastroesophageal reflux  "disease without esophagitis       syringe/needle (disp) 25G X 1\" 3 ML Misc     3 each    Use for B-12 injection    Bariatric surgery status       VITAMIN D (CHOLECALCIFEROL) PO      Take 2,000 Units by mouth daily    Bariatric surgery status, Obesity, unspecified         "

## 2018-05-16 NOTE — TELEPHONE ENCOUNTER
Panel Management Review      Patient has the following on her problem list:     Hypertension   Last three blood pressure readings:  BP Readings from Last 3 Encounters:   05/16/18 128/64   05/11/17 134/78   12/07/16 153/84     Blood pressure: Passed    HTN Guidelines:  Age 18-59 BP range:  Less than 140/90  Age 60-85 with Diabetes:  Less than 140/90  Age 60-85 without Diabetes:  less than 150/90      Composite cancer screening  Chart review shows that this patient is due/due soon for the following Mammogram  Summary:    Patient is due/failing the following:   MAMMOGRAM    Action needed:   Pt needs appt for mammo.    Type of outreach:    pt seen in clinic today and advised..ral    Questions for provider review:    None                                                                                                                                    .LISA OLIVERA LPN       Chart routed to none .

## 2018-05-16 NOTE — NURSING NOTE
"Chief Complaint   Patient presents with     Medicare Visit     fasting     initial /64 (BP Location: Left arm, Patient Position: Sitting, Cuff Size: Adult Large)  Pulse 67  Temp 98  F (36.7  C) (Oral)  Resp 12  Ht 5' 5.75\" (1.67 m)  Wt 206 lb (93.4 kg)  SpO2 98%  BMI 33.5 kg/m2 Estimated body mass index is 33.5 kg/(m^2) as calculated from the following:    Height as of this encounter: 5' 5.75\" (1.67 m).    Weight as of this encounter: 206 lb (93.4 kg)..  bp completed using cuff size large  LISA OLIVERA LPN  "

## 2018-05-17 LAB
ALBUMIN SERPL-MCNC: 3.5 G/DL (ref 3.4–5)
ALP SERPL-CCNC: 101 U/L (ref 40–150)
ALT SERPL W P-5'-P-CCNC: 40 U/L (ref 0–50)
ANION GAP SERPL CALCULATED.3IONS-SCNC: 7 MMOL/L (ref 3–14)
AST SERPL W P-5'-P-CCNC: 21 U/L (ref 0–45)
BILIRUB SERPL-MCNC: 0.8 MG/DL (ref 0.2–1.3)
BUN SERPL-MCNC: 21 MG/DL (ref 7–30)
CALCIUM SERPL-MCNC: 9 MG/DL (ref 8.5–10.1)
CHLORIDE SERPL-SCNC: 114 MMOL/L (ref 94–109)
CHOLEST SERPL-MCNC: 165 MG/DL
CO2 SERPL-SCNC: 24 MMOL/L (ref 20–32)
CREAT SERPL-MCNC: 1.21 MG/DL (ref 0.52–1.04)
DEPRECATED CALCIDIOL+CALCIFEROL SERPL-MC: 37 UG/L (ref 20–75)
GFR SERPL CREATININE-BSD FRML MDRD: 44 ML/MIN/1.7M2
GLUCOSE SERPL-MCNC: 95 MG/DL (ref 70–99)
HDLC SERPL-MCNC: 48 MG/DL
LDLC SERPL CALC-MCNC: 98 MG/DL
NONHDLC SERPL-MCNC: 117 MG/DL
POTASSIUM SERPL-SCNC: 3.9 MMOL/L (ref 3.4–5.3)
PROT SERPL-MCNC: 6.8 G/DL (ref 6.8–8.8)
SODIUM SERPL-SCNC: 145 MMOL/L (ref 133–144)
TRIGL SERPL-MCNC: 97 MG/DL
TSH SERPL DL<=0.005 MIU/L-ACNC: 1.96 MU/L (ref 0.4–4)
URATE SERPL-MCNC: 4.4 MG/DL (ref 2.6–6)

## 2018-05-18 RX ORDER — LEVOTHYROXINE SODIUM 100 UG/1
100 TABLET ORAL DAILY
Qty: 90 TABLET | Refills: 3 | Status: SHIPPED | OUTPATIENT
Start: 2018-05-18 | End: 2019-05-22

## 2018-05-22 ENCOUNTER — OFFICE VISIT (OUTPATIENT)
Dept: INTERNAL MEDICINE | Facility: CLINIC | Age: 68
End: 2018-05-22
Payer: COMMERCIAL

## 2018-05-22 ENCOUNTER — HOSPITAL ENCOUNTER (OUTPATIENT)
Dept: MAMMOGRAPHY | Facility: CLINIC | Age: 68
Discharge: HOME | End: 2018-05-22
Attending: NURSE PRACTITIONER | Admitting: NURSE PRACTITIONER
Payer: MEDICARE

## 2018-05-22 VITALS
WEIGHT: 209.1 LBS | OXYGEN SATURATION: 98 % | SYSTOLIC BLOOD PRESSURE: 130 MMHG | DIASTOLIC BLOOD PRESSURE: 70 MMHG | BODY MASS INDEX: 33.61 KG/M2 | HEIGHT: 66 IN | TEMPERATURE: 98.1 F | HEART RATE: 75 BPM

## 2018-05-22 DIAGNOSIS — M10.9 GOUT WITHOUT TOPHUS: ICD-10-CM

## 2018-05-22 DIAGNOSIS — I10 ESSENTIAL HYPERTENSION: ICD-10-CM

## 2018-05-22 DIAGNOSIS — H26.9 CATARACT OF BOTH EYES, UNSPECIFIED CATARACT TYPE: ICD-10-CM

## 2018-05-22 DIAGNOSIS — Z98.84 BARIATRIC SURGERY STATUS: ICD-10-CM

## 2018-05-22 DIAGNOSIS — Z01.818 PREOP GENERAL PHYSICAL EXAM: Primary | ICD-10-CM

## 2018-05-22 DIAGNOSIS — E66.811 OBESITY (BMI 30.0-34.9): ICD-10-CM

## 2018-05-22 DIAGNOSIS — Z12.31 ENCOUNTER FOR SCREENING MAMMOGRAM FOR BREAST CANCER: ICD-10-CM

## 2018-05-22 DIAGNOSIS — E03.9 HYPOTHYROIDISM, UNSPECIFIED TYPE: ICD-10-CM

## 2018-05-22 DIAGNOSIS — K21.9 GASTROESOPHAGEAL REFLUX DISEASE WITHOUT ESOPHAGITIS: ICD-10-CM

## 2018-05-22 PROCEDURE — 77063 BREAST TOMOSYNTHESIS BI: CPT

## 2018-05-22 PROCEDURE — 99214 OFFICE O/P EST MOD 30 MIN: CPT | Performed by: NURSE PRACTITIONER

## 2018-05-22 NOTE — PATIENT INSTRUCTIONS
No aspirin ibuprofen or aleve products prior to surgery   May use tylenol products       May take medication morning of surgery     Before Your Surgery      Call your surgeon if there is any change in your health. This includes signs of a cold or flu (such as a sore throat, runny nose, cough, rash or fever).    Do not smoke, drink alcohol or take over the counter medicine (unless your surgeon or primary care doctor tells you to) for the 24 hours before and after surgery.    If you take prescribed drugs: Follow your doctor s orders about which medicines to take and which to stop until after surgery.    Eating and drinking prior to surgery: follow the instructions from your surgeon    Take a shower or bath the night before surgery. Use the soap your surgeon gave you to gently clean your skin. If you do not have soap from your surgeon, use your regular soap. Do not shave or scrub the surgery site.  Wear clean pajamas and have clean sheets on your bed.

## 2018-05-22 NOTE — MR AVS SNAPSHOT
After Visit Summary   5/22/2018    Amy Haq    MRN: [id number]           Patient Information     Date Of Birth          1950        Visit Information        Provider Department      5/22/2018 8:40 AM Francisca Sainz APRN [facility]        Today's Diagnoses     Preop general physical exam    -  1    Cataract of both eyes, unspecified cataract type        Essential hypertension        Bariatric surgery status        Gout without tophus        Gastroesophageal reflux disease without esophagitis        Obesity (BMI 30.0-34.9)        Hypothyroidism, unspecified type          Care Instructions    No aspirin ibuprofen or aleve products prior to surgery   May use tylenol products       May take medication morning of surgery     Before Your Surgery      Call your surgeon if there is any change in your health. This includes signs of a cold or flu (such as a sore throat, runny nose, cough, rash or fever).    Do not smoke, drink alcohol or take over the counter medicine (unless your surgeon or primary care doctor tells you to) for the 24 hours before and after surgery.    If you take prescribed drugs: Follow your doctor s orders about which medicines to take and which to stop until after surgery.    Eating and drinking prior to surgery: follow the instructions from your surgeon    Take a shower or bath the night before surgery. Use the soap your surgeon gave you to gently clean your skin. If you do not have soap from your surgeon, use your regular soap. Do not shave or scrub the surgery site.  Wear clean pajamas and have clean sheets on your bed.           Follow-ups after your visit        Your next 10 appointments already scheduled     May 29, 2018   Procedure with Tyree Mcintyre MD   [facility] (--)    [address]  [facility] [address] [phone]              Who to contact     If you have questions or need follow up  "information about today's clinic visit or your schedule please contact [facility] directly at [phone].  Normal or non-critical lab and imaging results will be communicated to you by MyChart, letter or phone within 4 business days after the clinic has received the results. If you do not hear from us within 7 days, please contact the clinic through [employer] or phone. If you have a critical or abnormal lab result, we will notify you by phone as soon as possible.  Submit refill requests through [employer] or call your pharmacy and they will forward the refill request to us. Please allow 3 business days for your refill to be completed.          Additional Information About Your Visit        [employer] Information     [employer] gives you secure access to your electronic health record. If you see a primary care provider, you can also send messages to your care team and make appointments. If you have questions, please call your primary care clinic.  If you do not have a primary care provider, please call [phone] and they will assist you.        Care EveryWhere ID     This is your Care EveryWhere ID. This could be used by other organizations to access your [place] medical records  [id number]        Your Vitals Were     Pulse Temperature Height Pulse Oximetry Breastfeeding? BMI (Body Mass Index)    75 98.1  F (36.7  C) (Oral) 5' 5.75\" (1.67 m) 98% No 34.01 kg/m2       Blood Pressure from Last 3 Encounters:   05/22/18 130/70   05/16/18 128/64   05/11/17 134/78    Weight from Last 3 Encounters:   05/22/18 209 lb 1.6 oz (94.8 kg)   05/16/18 206 lb (93.4 kg)   05/11/17 198 lb 4.8 oz (89.9 kg)              Today, you had the following     No orders found for display       Primary Care Provider Office Phone # Fax #    Mouna Chaves [phone]       [address]   [facility] [address]        Equal Access to Services     HONORIO BELL AH: Hadii franca Arellano, waaxgilda " isai wandademar palmeriris chappell ah. So [facility] [phone].    ATENCIÓN: Si tapan machado, tiene a luis disposición servicios gratuitos de asistencia lingüística. Donya al [phone].    We comply with applicable federal civil rights laws and [place] laws. We do not discriminate on the basis of race, color, national origin, age, disability, sex, sexual orientation, or gender identity.            Thank you!     Thank you for choosing [facility]  for your care. Our goal is always to provide you with excellent care. Hearing back from our patients is one way we can continue to improve our services. Please take a few minutes to complete the written survey that you may receive in the mail after your visit with us. Thank you!             Your Updated Medication List - Protect others around you: Learn how to safely use, store and throw away your medicines at www.disposemymeds.org.          This list is accurate as of 5/22/18  9:24 AM.  Always use your most recent med list.                   Brand Name Dispense Instructions for use Diagnosis    allopurinol 100 MG tablet    ZYLOPRIM    180 tablet    Take 2 tablets (200 mg) by mouth daily    Gout without tophus       CALCIUM + D PO      Take 2 tablets by mouth daily 600mg + vitamin D        cetirizine 10 MG tablet    zyrTEC     Take 10 mg by mouth daily.        cyanocobalamin 1000 MCG/ML injection    VITAMIN B12    2 mL    inject 1 ml subcutaneous every 45 days    Postoperative malabsorption       ICAPS AREDS FORMULA PO      Take 1 capsule by mouth daily    Bariatric surgery status, Obesity (BMI 30.0-34.9)       levothyroxine 100 MCG tablet    SYNTHROID/LEVOTHROID    90 tablet    Take 1 tablet (100 mcg) by mouth daily    Acquired hypothyroidism       ranitidine 150 MG tablet    ZANTAC    180 tablet    Take 1 tablet (150 mg) by mouth 2 times daily    Bariatric surgery status, Gastroesophageal reflux disease without  "esophagitis       syringe/needle (disp) 25G X 1\" 3 ML Misc     3 each    Use for B-12 injection    Bariatric surgery status       VITAMIN D (CHOLECALCIFEROL) PO      Take 2,000 Units by mouth daily    Bariatric surgery status, Obesity, unspecified         "

## 2018-05-22 NOTE — PROGRESS NOTES
[place] [address]  [facility] [address]  [phone]  Dept: [phone]    PRE-OP EVALUATION:  Today's date: 2018    Amy Haq (: 1950) presents for pre-operative evaluation assessment as requested by Dr. Mcintyre.  She requires evaluation and anesthesia risk assessment prior to undergoing surgery/procedure for treatment of Cataract on the right eye. .    Fax number for surgical facility: [phone]  Primary Physician: Mouna Chaves  Type of Anesthesia Anticipated: to be determined    Patient has a Health Care Directive or Living Will:  NO    Preop Questions 2018   Who is doing your surgery? lisa   What are you having done? cataract   Date of Surgery/Procedure: 18   Facility or Hospital where procedure/surgery will be performed: [facility]   1.  Do you have a history of Heart attack, stroke, stent, coronary bypass surgery, or other heart surgery? No   2.  Do you ever have any pain or discomfort in your chest? No   3.  Do you have a history of  Heart Failure? No   4.   Are you troubled by shortness of breath when:  walking on a level surface, or up a slight hill, or at night? No   5.  Do you currently have a cold, bronchitis or other respiratory infection? No   6.  Do you have a cough, shortness of breath, or wheezing? No   7.  Do you sometimes get pains in the calves of your legs when you walk? No- taking magnesium for leg cramps and helping    8. Do you or anyone in your family have previous history of blood clots? No   9.  Do you or does anyone in your family have a serious bleeding problem such as prolonged bleeding following surgeries or cuts? No   10. Have you ever had problems with anemia or been told to take iron pills? No   11. Have you had any abnormal blood loss such as black, tarry or bloody stools, or abnormal vaginal bleeding? No   12. Have you ever had a blood transfusion? YES 30 years ago after hysterectomy    13. Have  you or any of your relatives ever had problems with anesthesia? No   14. Do you have sleep apnea, excessive snoring or daytime drowsiness? No   15. Do you have any prosthetic heart valves? No   16. Do you have prosthetic joints? No   17. Is there any chance that you may be pregnant? No         HPI:     HPI related to upcoming procedure: had RK 20 years ago- scar from previous procedures  Wants to do one eye and see how she does before doing the second one        See problem list for active medical problems.  Problems all longstanding and stable, except as noted/documented.  See ROS for pertinent symptoms related to these conditions.                                                                                                                                                          .    MEDICAL HISTORY:     Patient Active Problem List    Diagnosis Date Noted     Hypothyroidism, unspecified type 05/16/2018     Priority: Medium     Obesity (BMI 30.0-34.9) 12/07/2016     Priority: Medium     Gout without tophus 05/09/2016     Priority: Medium     Gastroesophageal reflux disease without esophagitis 05/09/2016     Priority: Medium     Bariatric surgery status 12/09/2014     Priority: Medium     Advanced directives, counseling/discussion 02/16/2012     Priority: Medium     Discussed Advance Directive planning with patient; information given to patient to review.         Hyperlipidemia LDL goal <100 03/15/2011     Priority: Medium     CARDIOVASCULAR SCREENING; LDL GOAL LESS THAN 100 10/31/2010     Priority: Medium     Essential hypertension 10/31/2007     Priority: Medium     Problem list name updated by automated process. Provider to review       Hyperparathyroidism (H) 10/31/2007     Priority: Medium     Problem list name updated by automated process. Provider to review       Disorder resulting from impaired renal function 10/31/2007     Priority: Medium     Problem list name updated by automated process. Provider to  "review        Past Medical History:   Diagnosis Date     Esophageal reflux      Hypertension      Numbness and tingling     right leg tingling     Renal disease     renal insufficiency     Thyroid disease      Past Surgical History:   Procedure Laterality Date     APPENDECTOMY       COLONOSCOPY       EXCISE LESION BACK N/A 12/3/2014    Procedure: EXCISE LESION BACK;  Surgeon: Elvin Patiño MD;  Location:  OR     HEAD & NECK SURGERY      Parathyriod tumor removal     HYSTERECTOMY, PAP NO LONGER INDICATED       LAPAROSCOPIC BYPASS GASTRIC N/A 12/3/2014    Procedure: LAPAROSCOPIC BYPASS GASTRIC;  Surgeon: Elvin Patiño MD;  Location:  OR     LAPAROSCOPIC LYSIS ADHESIONS N/A 12/3/2014    Procedure: LAPAROSCOPIC LYSIS ADHESIONS;  Surgeon: Elvin Patiño MD;  Location:  OR     Current Outpatient Prescriptions   Medication Sig Dispense Refill     allopurinol (ZYLOPRIM) 100 MG tablet Take 2 tablets (200 mg) by mouth daily 180 tablet 3     Calcium Carbonate-Vitamin D (CALCIUM + D PO) Take 2 tablets by mouth daily 600mg + vitamin D       cetirizine (ZYRTEC) 10 MG tablet Take 10 mg by mouth daily.       cyanocobalamin (VITAMIN B12) 1000 MCG/ML injection inject 1 ml subcutaneous every 45 days 2 mL 3     levothyroxine (SYNTHROID/LEVOTHROID) 100 MCG tablet Take 1 tablet (100 mcg) by mouth daily 90 tablet 3     Multiple Vitamins-Minerals (ICAPS AREDS FORMULA PO) Take 1 capsule by mouth daily       ranitidine (ZANTAC) 150 MG tablet Take 1 tablet (150 mg) by mouth 2 times daily 180 tablet 3     syringe/needle, disp, 25G X 1\" 3 ML MISC Use for B-12 injection 3 each 3     VITAMIN D, CHOLECALCIFEROL, PO Take 2,000 Units by mouth daily        OTC products: None, except as noted above    Allergies   Allergen Reactions     Lisinopril      Cough.      Latex Allergy: NO    Social History   Substance Use Topics     Smoking status: Former Smoker     Packs/day: 0.50     Years: 35.00     Types: Cigarettes     " "Quit date: 9/26/2006     Smokeless tobacco: Never Used     Alcohol use Yes      Comment: occasionally     History   Drug Use No       REVIEW OF SYSTEMS:   CONSTITUTIONAL: NEGATIVE for fever, chills, change in weight  INTEGUMENTARY/SKIN: NEGATIVE for worrisome rashes, moles or lesions  EYES: known cataracts bilateral   ENT/MOUTH: NEGATIVE for ear, mouth and throat problems- allergy and nose dripping at times   RESP: NEGATIVE for significant cough or SOB  BREAST: NEGATIVE for masses, tenderness or discharge  CV: NEGATIVE for chest pain, palpitations or peripheral edema  GI: NEGATIVE for nausea, abdominal pain, heartburn, or change in bowel habits  : NEGATIVE for frequency, dysuria, or hematuria  MUSCULOSKELETAL: NEGATIVE for significant arthralgias or myalgia  NEURO: NEGATIVE for weakness, dizziness or paresthesias  ENDOCRINE: NEGATIVE for temperature intolerance, skin/hair changes  HEME: NEGATIVE for bleeding problems  PSYCHIATRIC: NEGATIVE for changes in mood or affect    EXAM:   /70 (BP Location: Left arm, Patient Position: Chair, Cuff Size: Adult Regular)  Pulse 75  Temp 98.1  F (36.7  C) (Oral)  Ht 5' 5.75\" (1.67 m)  Wt 209 lb 1.6 oz (94.8 kg)  SpO2 98%  Breastfeeding? No  BMI 34.01 kg/m2    GENERAL APPEARANCE: alert and no distress     HENT: ear canals and TM's normal and nose and mouth without ulcers or lesions     RESP: lungs clear to auscultation - no rales, rhonchi or wheezes     CV: regular rates and rhythm, normal S1 S2, no S3 or S4 and no murmur, click or rub     ABDOMEN:  soft, nontender, no HSM or masses and bowel sounds normal     MS: extremities normal- no gross deformities noted, no evidence of inflammation in joints, FROM in all extremities.     SKIN: no suspicious lesions or rashes     NEURO: Normal strength and tone, sensory exam grossly normal, mentation intact and speech normal     PSYCH: mentation appears normal. and affect normal/bright    DIAGNOSTICS:   No labs or EKG required " for low risk surgery (cataract, skin procedure, breast biopsy, etc)  EKG: appears normal, NSR, normal axis, normal intervals, no acute ST/T changes c/w ischemia, no LVH by voltage criteria, unchanged from previous tracings    Recent Labs   Lab Test  05/16/18   0948  05/11/17   0817   05/28/14   0814   HGB  13.0  13.7   < >   --    PLT  222  199   < >   --    INR   --    --    --   1.01   NA  145*  144   < >   --    POTASSIUM  3.9  4.3   < >   --    CR  1.21*  1.38*   < >   --    A1C   --    --    --   5.7    < > = values in this interval not displayed.        IMPRESSION:   Reason for surgery/procedure: cataract - will do one eye and if tolerates will do opposite eye   Diagnosis/reason for consult: pre op     The proposed surgical procedure is considered LOW risk.    REVISED CARDIAC RISK INDEX  The patient has the following serious cardiovascular risks for perioperative complications such as (MI, PE, VFib and 3  AV Block):  No serious cardiac risks  INTERPRETATION: 1 risks: Class II (low risk - 0.9% complication rate)    The patient has the following additional risks for perioperative complications:  No identified additional risks      ICD-10-CM    1. Preop general physical exam Z01.818    2. Cataract of both eyes, unspecified cataract type H26.9    3. Essential hypertension I10    4. Bariatric surgery status Z98.84    5. Gout without tophus M10.9    6. Gastroesophageal reflux disease without esophagitis K21.9    7. Obesity (BMI 30.0-34.9) E66.9    8. Hypothyroidism, unspecified type E03.9        RECOMMENDATIONS:         --Patient is to take all scheduled medications on the day of surgery EXCEPT for modifications listed below.    APPROVAL GIVEN to proceed with proposed procedure, without further diagnostic evaluation       Signed Electronically by: MOUNA Watkins CNP    Copy of this evaluation report is provided to requesting physician.    [facility] Preop Guidelines    Revised Cardiac Risk Index

## 2018-05-25 NOTE — H&P (VIEW-ONLY)
[place] [address]  [facility] [address]  [phone]  Dept: [phone]    PRE-OP EVALUATION:  Today's date: 2018    Amy Haq (: 1950) presents for pre-operative evaluation assessment as requested by Dr. Mcintyre.  She requires evaluation and anesthesia risk assessment prior to undergoing surgery/procedure for treatment of Cataract on the right eye. .    Fax number for surgical facility: [phone]  Primary Physician: Mouna Chaves  Type of Anesthesia Anticipated: to be determined    Patient has a Health Care Directive or Living Will:  NO    Preop Questions 2018   Who is doing your surgery? lisa   What are you having done? cataract   Date of Surgery/Procedure: 18   Facility or Hospital where procedure/surgery will be performed: [facility]   1.  Do you have a history of Heart attack, stroke, stent, coronary bypass surgery, or other heart surgery? No   2.  Do you ever have any pain or discomfort in your chest? No   3.  Do you have a history of  Heart Failure? No   4.   Are you troubled by shortness of breath when:  walking on a level surface, or up a slight hill, or at night? No   5.  Do you currently have a cold, bronchitis or other respiratory infection? No   6.  Do you have a cough, shortness of breath, or wheezing? No   7.  Do you sometimes get pains in the calves of your legs when you walk? No- taking magnesium for leg cramps and helping    8. Do you or anyone in your family have previous history of blood clots? No   9.  Do you or does anyone in your family have a serious bleeding problem such as prolonged bleeding following surgeries or cuts? No   10. Have you ever had problems with anemia or been told to take iron pills? No   11. Have you had any abnormal blood loss such as black, tarry or bloody stools, or abnormal vaginal bleeding? No   12. Have you ever had a blood transfusion? YES 30 years ago after hysterectomy    13. Have  you or any of your relatives ever had problems with anesthesia? No   14. Do you have sleep apnea, excessive snoring or daytime drowsiness? No   15. Do you have any prosthetic heart valves? No   16. Do you have prosthetic joints? No   17. Is there any chance that you may be pregnant? No         HPI:     HPI related to upcoming procedure: had RK 20 years ago- scar from previous procedures  Wants to do one eye and see how she does before doing the second one        See problem list for active medical problems.  Problems all longstanding and stable, except as noted/documented.  See ROS for pertinent symptoms related to these conditions.                                                                                                                                                          .    MEDICAL HISTORY:     Patient Active Problem List    Diagnosis Date Noted     Hypothyroidism, unspecified type 05/16/2018     Priority: Medium     Obesity (BMI 30.0-34.9) 12/07/2016     Priority: Medium     Gout without tophus 05/09/2016     Priority: Medium     Gastroesophageal reflux disease without esophagitis 05/09/2016     Priority: Medium     Bariatric surgery status 12/09/2014     Priority: Medium     Advanced directives, counseling/discussion 02/16/2012     Priority: Medium     Discussed Advance Directive planning with patient; information given to patient to review.         Hyperlipidemia LDL goal <100 03/15/2011     Priority: Medium     CARDIOVASCULAR SCREENING; LDL GOAL LESS THAN 100 10/31/2010     Priority: Medium     Essential hypertension 10/31/2007     Priority: Medium     Problem list name updated by automated process. Provider to review       Hyperparathyroidism (H) 10/31/2007     Priority: Medium     Problem list name updated by automated process. Provider to review       Disorder resulting from impaired renal function 10/31/2007     Priority: Medium     Problem list name updated by automated process. Provider to  "review        Past Medical History:   Diagnosis Date     Esophageal reflux      Hypertension      Numbness and tingling     right leg tingling     Renal disease     renal insufficiency     Thyroid disease      Past Surgical History:   Procedure Laterality Date     APPENDECTOMY       COLONOSCOPY       EXCISE LESION BACK N/A 12/3/2014    Procedure: EXCISE LESION BACK;  Surgeon: Elvin Patiño MD;  Location:  OR     HEAD & NECK SURGERY      Parathyriod tumor removal     HYSTERECTOMY, PAP NO LONGER INDICATED       LAPAROSCOPIC BYPASS GASTRIC N/A 12/3/2014    Procedure: LAPAROSCOPIC BYPASS GASTRIC;  Surgeon: Elvin Patiño MD;  Location:  OR     LAPAROSCOPIC LYSIS ADHESIONS N/A 12/3/2014    Procedure: LAPAROSCOPIC LYSIS ADHESIONS;  Surgeon: Elvin Patiño MD;  Location:  OR     Current Outpatient Prescriptions   Medication Sig Dispense Refill     allopurinol (ZYLOPRIM) 100 MG tablet Take 2 tablets (200 mg) by mouth daily 180 tablet 3     Calcium Carbonate-Vitamin D (CALCIUM + D PO) Take 2 tablets by mouth daily 600mg + vitamin D       cetirizine (ZYRTEC) 10 MG tablet Take 10 mg by mouth daily.       cyanocobalamin (VITAMIN B12) 1000 MCG/ML injection inject 1 ml subcutaneous every 45 days 2 mL 3     levothyroxine (SYNTHROID/LEVOTHROID) 100 MCG tablet Take 1 tablet (100 mcg) by mouth daily 90 tablet 3     Multiple Vitamins-Minerals (ICAPS AREDS FORMULA PO) Take 1 capsule by mouth daily       ranitidine (ZANTAC) 150 MG tablet Take 1 tablet (150 mg) by mouth 2 times daily 180 tablet 3     syringe/needle, disp, 25G X 1\" 3 ML MISC Use for B-12 injection 3 each 3     VITAMIN D, CHOLECALCIFEROL, PO Take 2,000 Units by mouth daily        OTC products: None, except as noted above    Allergies   Allergen Reactions     Lisinopril      Cough.      Latex Allergy: NO    Social History   Substance Use Topics     Smoking status: Former Smoker     Packs/day: 0.50     Years: 35.00     Types: Cigarettes     " "Quit date: 9/26/2006     Smokeless tobacco: Never Used     Alcohol use Yes      Comment: occasionally     History   Drug Use No       REVIEW OF SYSTEMS:   CONSTITUTIONAL: NEGATIVE for fever, chills, change in weight  INTEGUMENTARY/SKIN: NEGATIVE for worrisome rashes, moles or lesions  EYES: known cataracts bilateral   ENT/MOUTH: NEGATIVE for ear, mouth and throat problems- allergy and nose dripping at times   RESP: NEGATIVE for significant cough or SOB  BREAST: NEGATIVE for masses, tenderness or discharge  CV: NEGATIVE for chest pain, palpitations or peripheral edema  GI: NEGATIVE for nausea, abdominal pain, heartburn, or change in bowel habits  : NEGATIVE for frequency, dysuria, or hematuria  MUSCULOSKELETAL: NEGATIVE for significant arthralgias or myalgia  NEURO: NEGATIVE for weakness, dizziness or paresthesias  ENDOCRINE: NEGATIVE for temperature intolerance, skin/hair changes  HEME: NEGATIVE for bleeding problems  PSYCHIATRIC: NEGATIVE for changes in mood or affect    EXAM:   /70 (BP Location: Left arm, Patient Position: Chair, Cuff Size: Adult Regular)  Pulse 75  Temp 98.1  F (36.7  C) (Oral)  Ht 5' 5.75\" (1.67 m)  Wt 209 lb 1.6 oz (94.8 kg)  SpO2 98%  Breastfeeding? No  BMI 34.01 kg/m2    GENERAL APPEARANCE: alert and no distress     HENT: ear canals and TM's normal and nose and mouth without ulcers or lesions     RESP: lungs clear to auscultation - no rales, rhonchi or wheezes     CV: regular rates and rhythm, normal S1 S2, no S3 or S4 and no murmur, click or rub     ABDOMEN:  soft, nontender, no HSM or masses and bowel sounds normal     MS: extremities normal- no gross deformities noted, no evidence of inflammation in joints, FROM in all extremities.     SKIN: no suspicious lesions or rashes     NEURO: Normal strength and tone, sensory exam grossly normal, mentation intact and speech normal     PSYCH: mentation appears normal. and affect normal/bright    DIAGNOSTICS:   No labs or EKG required " for low risk surgery (cataract, skin procedure, breast biopsy, etc)  EKG: appears normal, NSR, normal axis, normal intervals, no acute ST/T changes c/w ischemia, no LVH by voltage criteria, unchanged from previous tracings    Recent Labs   Lab Test  05/16/18   0948  05/11/17   0817   05/28/14   0814   HGB  13.0  13.7   < >   --    PLT  222  199   < >   --    INR   --    --    --   1.01   NA  145*  144   < >   --    POTASSIUM  3.9  4.3   < >   --    CR  1.21*  1.38*   < >   --    A1C   --    --    --   5.7    < > = values in this interval not displayed.        IMPRESSION:   Reason for surgery/procedure: cataract - will do one eye and if tolerates will do opposite eye   Diagnosis/reason for consult: pre op     The proposed surgical procedure is considered LOW risk.    REVISED CARDIAC RISK INDEX  The patient has the following serious cardiovascular risks for perioperative complications such as (MI, PE, VFib and 3  AV Block):  No serious cardiac risks  INTERPRETATION: 1 risks: Class II (low risk - 0.9% complication rate)    The patient has the following additional risks for perioperative complications:  No identified additional risks      ICD-10-CM    1. Preop general physical exam Z01.818    2. Cataract of both eyes, unspecified cataract type H26.9    3. Essential hypertension I10    4. Bariatric surgery status Z98.84    5. Gout without tophus M10.9    6. Gastroesophageal reflux disease without esophagitis K21.9    7. Obesity (BMI 30.0-34.9) E66.9    8. Hypothyroidism, unspecified type E03.9        RECOMMENDATIONS:         --Patient is to take all scheduled medications on the day of surgery EXCEPT for modifications listed below.    APPROVAL GIVEN to proceed with proposed procedure, without further diagnostic evaluation       Signed Electronically by: MOUNA Watkins CNP    Copy of this evaluation report is provided to requesting physician.    [facility] Preop Guidelines    Revised Cardiac Risk Index

## 2018-05-29 ENCOUNTER — HOSPITAL ENCOUNTER (OUTPATIENT)
Facility: CLINIC | Age: 68
Discharge: HOME | End: 2018-05-29
Attending: OPHTHALMOLOGY | Admitting: OPHTHALMOLOGY
Payer: MEDICARE

## 2018-05-29 ENCOUNTER — ANESTHESIA (OUTPATIENT)
Dept: SURGERY | Facility: CLINIC | Age: 68
End: 2018-05-29
Payer: MEDICARE

## 2018-05-29 ENCOUNTER — ANESTHESIA EVENT (OUTPATIENT)
Dept: SURGERY | Facility: CLINIC | Age: 68
End: 2018-05-29
Payer: MEDICARE

## 2018-05-29 ENCOUNTER — SURGERY (OUTPATIENT)
Age: 68
End: 2018-05-29

## 2018-05-29 VITALS
RESPIRATION RATE: 16 BRPM | TEMPERATURE: 97.4 F | OXYGEN SATURATION: 98 % | SYSTOLIC BLOOD PRESSURE: 129 MMHG | DIASTOLIC BLOOD PRESSURE: 63 MMHG

## 2018-05-29 PROCEDURE — 36000101 ZZH EYE SURGERY LEVEL 3 1ST 30 MIN: Performed by: OPHTHALMOLOGY

## 2018-05-29 PROCEDURE — 40000170 ZZH STATISTIC PRE-PROCEDURE ASSESSMENT II: Performed by: OPHTHALMOLOGY

## 2018-05-29 PROCEDURE — 37000008 ZZH ANESTHESIA TECHNICAL FEE, 1ST 30 MIN: Performed by: OPHTHALMOLOGY

## 2018-05-29 PROCEDURE — 25000128 H RX IP 250 OP 636: Performed by: ANESTHESIOLOGY

## 2018-05-29 PROCEDURE — 25000125 ZZHC RX 250: Performed by: OPHTHALMOLOGY

## 2018-05-29 PROCEDURE — 25000128 H RX IP 250 OP 636: Performed by: NURSE ANESTHETIST, CERTIFIED REGISTERED

## 2018-05-29 PROCEDURE — 71000028 ZZH EYE RECOVERY PHASE 2 EACH 15 MINS: Performed by: OPHTHALMOLOGY

## 2018-05-29 PROCEDURE — 25000125 ZZHC RX 250: Performed by: ANESTHESIOLOGY

## 2018-05-29 PROCEDURE — 27210794 ZZH OR GENERAL SUPPLY STERILE: Performed by: OPHTHALMOLOGY

## 2018-05-29 PROCEDURE — V2632 POST CHMBR INTRAOCULAR LENS: HCPCS | Performed by: OPHTHALMOLOGY

## 2018-05-29 PROCEDURE — 25000128 H RX IP 250 OP 636: Performed by: OPHTHALMOLOGY

## 2018-05-29 DEVICE — EYE IMP IOL AMO PCL TECNIS ZCB00 22.5: Type: IMPLANTABLE DEVICE | Site: EYE | Status: FUNCTIONAL

## 2018-05-29 RX ORDER — PROPARACAINE HYDROCHLORIDE 5 MG/ML
1 SOLUTION/ DROPS OPHTHALMIC ONCE
Status: DISCONTINUED | OUTPATIENT
Start: 2018-05-29 | End: 2018-05-29 | Stop reason: HOSPADM

## 2018-05-29 RX ORDER — BALANCED SALT SOLUTION 6.4; .75; .48; .3; 3.9; 1.7 MG/ML; MG/ML; MG/ML; MG/ML; MG/ML; MG/ML
SOLUTION OPHTHALMIC PRN
Status: DISCONTINUED | OUTPATIENT
Start: 2018-05-29 | End: 2018-05-29 | Stop reason: HOSPADM

## 2018-05-29 RX ORDER — ONDANSETRON 2 MG/ML
INJECTION INTRAMUSCULAR; INTRAVENOUS PRN
Status: DISCONTINUED | OUTPATIENT
Start: 2018-05-29 | End: 2018-05-29

## 2018-05-29 RX ORDER — PROPARACAINE HYDROCHLORIDE 5 MG/ML
1 SOLUTION/ DROPS OPHTHALMIC ONCE
Status: COMPLETED | OUTPATIENT
Start: 2018-05-29 | End: 2018-05-29

## 2018-05-29 RX ORDER — DICLOFENAC SODIUM 1 MG/ML
1 SOLUTION/ DROPS OPHTHALMIC
Status: COMPLETED | OUTPATIENT
Start: 2018-05-29 | End: 2018-05-29

## 2018-05-29 RX ORDER — PHENYLEPHRINE HYDROCHLORIDE 25 MG/ML
1 SOLUTION/ DROPS OPHTHALMIC
Status: COMPLETED | OUTPATIENT
Start: 2018-05-29 | End: 2018-05-29

## 2018-05-29 RX ORDER — LIDOCAINE HYDROCHLORIDE 10 MG/ML
INJECTION, SOLUTION EPIDURAL; INFILTRATION; INTRACAUDAL; PERINEURAL PRN
Status: DISCONTINUED | OUTPATIENT
Start: 2018-05-29 | End: 2018-05-29 | Stop reason: HOSPADM

## 2018-05-29 RX ORDER — TROPICAMIDE 10 MG/ML
1 SOLUTION/ DROPS OPHTHALMIC
Status: COMPLETED | OUTPATIENT
Start: 2018-05-29 | End: 2018-05-29

## 2018-05-29 RX ORDER — SODIUM CHLORIDE, SODIUM LACTATE, POTASSIUM CHLORIDE, CALCIUM CHLORIDE 600; 310; 30; 20 MG/100ML; MG/100ML; MG/100ML; MG/100ML
INJECTION, SOLUTION INTRAVENOUS CONTINUOUS
Status: DISCONTINUED | OUTPATIENT
Start: 2018-05-29 | End: 2018-05-29 | Stop reason: HOSPADM

## 2018-05-29 RX ORDER — LIDOCAINE 40 MG/G
CREAM TOPICAL
Status: DISCONTINUED | OUTPATIENT
Start: 2018-05-29 | End: 2018-05-29 | Stop reason: HOSPADM

## 2018-05-29 RX ORDER — MOXIFLOXACIN 5 MG/ML
1 SOLUTION/ DROPS OPHTHALMIC
Status: COMPLETED | OUTPATIENT
Start: 2018-05-29 | End: 2018-05-29

## 2018-05-29 RX ADMIN — DICLOFENAC SODIUM 1 DROP: 1 SOLUTION/ DROPS OPHTHALMIC at 12:25

## 2018-05-29 RX ADMIN — DICLOFENAC SODIUM 1 DROP: 1 SOLUTION/ DROPS OPHTHALMIC at 12:18

## 2018-05-29 RX ADMIN — LIDOCAINE HYDROCHLORIDE 1 ML: 10 INJECTION, SOLUTION EPIDURAL; INFILTRATION; INTRACAUDAL; PERINEURAL at 12:26

## 2018-05-29 RX ADMIN — SODIUM CHLORIDE, POTASSIUM CHLORIDE, SODIUM LACTATE AND CALCIUM CHLORIDE: 600; 310; 30; 20 INJECTION, SOLUTION INTRAVENOUS at 13:34

## 2018-05-29 RX ADMIN — MOXIFLOXACIN 1 DROP: 5 SOLUTION/ DROPS OPHTHALMIC at 12:18

## 2018-05-29 RX ADMIN — MIDAZOLAM 1 MG: 1 INJECTION INTRAMUSCULAR; INTRAVENOUS at 13:37

## 2018-05-29 RX ADMIN — TROPICAMIDE 1 DROP: 10 SOLUTION/ DROPS OPHTHALMIC at 12:25

## 2018-05-29 RX ADMIN — Medication 1 APPLICATOR: at 13:42

## 2018-05-29 RX ADMIN — DICLOFENAC SODIUM 1 DROP: 1 SOLUTION/ DROPS OPHTHALMIC at 12:27

## 2018-05-29 RX ADMIN — TROPICAMIDE 1 DROP: 10 SOLUTION/ DROPS OPHTHALMIC at 12:18

## 2018-05-29 RX ADMIN — LIDOCAINE HYDROCHLORIDE 1 ML: 10 INJECTION, SOLUTION EPIDURAL; INFILTRATION; INTRACAUDAL; PERINEURAL at 13:42

## 2018-05-29 RX ADMIN — PHENYLEPHRINE HYDROCHLORIDE 1 DROP: 2.5 SOLUTION/ DROPS OPHTHALMIC at 12:25

## 2018-05-29 RX ADMIN — ONDANSETRON 4 MG: 2 INJECTION INTRAMUSCULAR; INTRAVENOUS at 13:38

## 2018-05-29 RX ADMIN — PROPARACAINE HYDROCHLORIDE 1 DROP: 5 SOLUTION/ DROPS OPHTHALMIC at 12:18

## 2018-05-29 RX ADMIN — MIDAZOLAM 1 MG: 1 INJECTION INTRAMUSCULAR; INTRAVENOUS at 13:34

## 2018-05-29 RX ADMIN — MOXIFLOXACIN 1 DROP: 5 SOLUTION/ DROPS OPHTHALMIC at 12:27

## 2018-05-29 RX ADMIN — EPINEPHRINE 500 ML: 1 INJECTION, SOLUTION, CONCENTRATE INTRAVENOUS at 13:41

## 2018-05-29 RX ADMIN — PHENYLEPHRINE HYDROCHLORIDE 1 DROP: 2.5 SOLUTION/ DROPS OPHTHALMIC at 12:18

## 2018-05-29 RX ADMIN — LIDOCAINE HYDROCHLORIDE 0.5 ML: 35 GEL OPHTHALMIC at 13:41

## 2018-05-29 RX ADMIN — BALANCED SALT SOLUTION 15 ML: 6.4; .75; .48; .3; 3.9; 1.7 SOLUTION OPHTHALMIC at 13:41

## 2018-05-29 RX ADMIN — MOXIFLOXACIN 1 DROP: 5 SOLUTION/ DROPS OPHTHALMIC at 12:25

## 2018-05-29 RX ADMIN — TROPICAMIDE 1 DROP: 10 SOLUTION/ DROPS OPHTHALMIC at 12:27

## 2018-05-29 RX ADMIN — PHENYLEPHRINE HYDROCHLORIDE 1 DROP: 2.5 SOLUTION/ DROPS OPHTHALMIC at 12:27

## 2018-05-29 NOTE — ANESTHESIA CARE TRANSFER NOTE
Patient: Amy Haq    Procedure(s):  RIGHT EYE PHACOEMULSIFICATION CLEAR CORNEA WITH STANDARD INTRAOCULAR LENS IMPLANT  - Wound Class: I-Clean    Diagnosis: cataract  Diagnosis Additional Information: No value filed.    Anesthesia Type:   MAC     Note:  Airway :Room Air  Patient transferred to:PACU  Comments: Transferred to [facility] PACU, spontaneous RR, on room air.  Monitors and alarms on and functioning, VSS, patient awake and comfortable.  Report to [facility] PACU RN.Handoff Report: Identifed the Patient, Identified the Reponsible Provider, Reviewed the pertinent medical history, Discussed the surgical course, Reviewed Intra-OP anesthesia mangement and issues during anesthesia, Set expectations for post-procedure period and Allowed opportunity for questions and acknowledgement of understanding      Vitals: (Last set prior to Anesthesia Care Transfer)    CRNA VITALS  5/29/2018 1328 - 5/29/2018 1401      5/29/2018             Resp Rate (set): 10                Electronically Signed By: MOUNA Girard CRNA  May 29, 2018  2:01 PM

## 2018-05-29 NOTE — ANESTHESIA PREPROCEDURE EVALUATION
Anesthesia Evaluation     . Pt has had prior anesthetic.     No history of anesthetic complications          ROS/MED HX    ENT/Pulmonary:      (-) sleep apnea   Neurologic:       Cardiovascular:     (+) Dyslipidemia, hypertension----. : . . . :. .       METS/Exercise Tolerance:     Hematologic:         Musculoskeletal:         GI/Hepatic:     (+) GERD       Renal/Genitourinary:         Endo:         Psychiatric:         Infectious Disease:         Malignancy:         Other:                                    Anesthesia Plan      History & Physical Review  History and physical reviewed and following examination; no interval change.    ASA Status:  2 .    NPO Status:  > 8 hours    Plan for MAC Reason for MAC:  Procedure to face, neck, head or breast  PONV prophylaxis:  Ondansetron (or other 5HT-3)       Postoperative Care  Postoperative pain management:  Oral pain medications.      Consents  Anesthetic plan, risks, benefits and alternatives discussed with:  Patient..

## 2018-05-29 NOTE — IP AVS SNAPSHOT
[facility]    [address]    [place] [address]    Phone:  [phone]    Fax:  [phone]                                       After Visit Summary   5/29/2018    Amy Haq    MRN: [id number]           After Visit Summary Signature Page     I have received my discharge instructions, and my questions have been answered. I have discussed any challenges I see with this plan with the nurse or doctor.    ..........................................................................................................................................  Patient/Patient Representative Signature      ..........................................................................................................................................  Patient Representative Print Name and Relationship to Patient    ..................................................               ................................................  Date                                            Time    ..........................................................................................................................................  Reviewed by Signature/Title    ...................................................              ..............................................  Date                                                            Time

## 2018-05-29 NOTE — ANESTHESIA POSTPROCEDURE EVALUATION
Patient: Amy Haq    Procedure(s):  RIGHT EYE PHACOEMULSIFICATION CLEAR CORNEA WITH STANDARD INTRAOCULAR LENS IMPLANT  - Wound Class: I-Clean    Diagnosis:cataract  Diagnosis Additional Information: No value filed.    Anesthesia Type:  MAC    Note:  Anesthesia Post Evaluation    Patient location during evaluation: PACU  Patient participation: Able to fully participate in evaluation  Level of consciousness: awake  Pain management: adequate  Airway patency: patent  Cardiovascular status: acceptable  Respiratory status: acceptable  Hydration status: acceptable  PONV: none     Anesthetic complications: None          Last vitals:  Vitals:    05/29/18 1220 05/29/18 1359 05/29/18 1415   BP: 149/83 (!) 148/100 129/63   Resp:  16 16   Temp: 36.3  C (97.4  F)     SpO2: 98% 97% 98%         Electronically Signed By: Yesi Schroeder MD, MD  May 29, 2018  3:56 PM

## 2018-05-29 NOTE — OP NOTE
PREOPERATIVE DIAGNOSIS: Cataract, Right eye.   POSTOPERATIVE DIAGNOSIS: Cataract, Right eye.   OPERATION: Phacoemulsification with implantation of posterior chamber intraocular lens, Right eye.   ANESTHESIA: Monitored anesthesia care.   INDICATIONS FOR SURGERY: Amy Haq has noted a progressive decline in the vision of her Right eye secondary to a cataract. This has affected her ability to perform routine functions including reading. The patient has progressive cataract changes consistent with her vision and symptoms.     PROCEDURE: Informed consent was obtained from the patient preoperatively with the risks and alternatives reviewed, including the possibility of loss of vision. In the preoperative area, the patient was administered topical anesthetic consisting of 2% Xylocaine jelly. The patient was taken to the operating room. The face was prepped and draped in the usual sterile fashion. Attention was directed to the Right eye. A stab incision was made at the limbus with a 15-degree blade. Viscoat was used to replace aqueous. A keratome was used to make a limbal self-sealing incision 2.5 mm in diameter. A curvilinear capsulorrhexis was performed with the Utrata forceps. Hydrodissection was carried out. The nucleus was removed with the phacoemulsification handpiece in a four-quadrant cracking technique. The cortex was removed with the irrigation and aspiration handpiece. The posterior capsule remained intact. Provisc was used to inflate the capsular bag. A posterior chamber intraocular lens was taken from its case and inspected. It was free of defects and it was folded into the shooter. The lens was then injected into the eye by directing the leading haptic into the capsular bag. The trailing haptic was then placed in the eye with a haptic . The lens centered well. The Provisc was removed from the eye with the I&A handpiece. The eye was inflated with balanced salt solution. The wound was inspected  and found to be watertight. Topical Vigamox and Pred Forte were applied. An eye shield was placed over the eye. The patient tolerated the procedure well and left the operating area in good condition.       Implant Name Type Inv. Item Serial No.  Lot No. LRB No. Used   EYE IMP IOL ALMA PCL TECNIS ZCB00 22.5 Lens/Eye Implant EYE IMP IOL ALMA PCL TECNIS ZCB00 22.5 [id number] [employer]   Right 1       Tyree Mcintyre M.D.

## 2018-05-29 NOTE — IP AVS SNAPSHOT
MRN:[id number]                      After Visit Summary   5/29/2018    Amy Haq    MRN: [id number]           Thank you!     Thank you for choosing [facility] for your care. Our goal is always to provide you with excellent care. Hearing back from our patients is one way we can continue to improve our services. Please take a few minutes to complete the written survey that you may receive in the mail after you visit with us. Thank you!        Patient Information     Date Of Birth          1950        About your hospital stay     You were admitted on:  May 29, 2018 You last received care in the:  [facility]    You were discharged on:  May 29, 2018       Who to Call     For medical emergencies, please call 911.  For non-urgent questions about your medical care, please call your primary care provider or clinic, [phone]  For questions related to your surgery, please call your surgery clinic        Attending Provider     Provider Specialty    Tyree Mcintyre MD Ophthalmology       Primary Care Provider Office Phone # Fax #    Francisca Fish MOUNA Sainz [facility] [phone]      Further instructions from your care team       [facility]  Cataract Surgery Discharge Instructions  [facility] MD GINA Ruiz MD J. Hasan, MD C. Nichols, MD J. O'Neill, MD S. Schaefer, MD J. Stephens, MD        Start using drops when you arrive at home today    You may have been prescribed SmartDrops or OneDrop, which is a compound formula drop that combines all three medications in a single drop. This drop should be instilled to the surgical eye 3 times daily until gone.    Place shield over surgical eye at bedtime for 3 nights.      The eye will feel itchy, scratchy, and vision will be blurred, you may take Tylenol for the scratchy feeling if this is bothersome.      No eye rubbing or  swimming for I week.      You may resume all prescription medications as directed by your primary doctor.      Call if increasing pain, progressively worsening vision or worsening redness of surgical eye.      On-call doctor can be reached at [phone].        [facility] Discharge  Instructions  Anesthesia (Eye Care Center)   Adult Discharge Instructions    For 24 hours after surgery    1. Get plenty of rest.  Make arrangements to have a responsible adult stay with you for at least 6 hours after you leave the hospital.  2. Do not drive or use heavy equipment for 24 hours.    3. Do not drink alcohol for 24 hours.  4. Do not sign legal documents or make important decisions for 24 hours.  5. Avoid strenuous or risky activities. You may feel lightheaded.  If so, sit for a few minutes before standing.  Have someone help you get up.   6. Conscious sedation patients may resume a regular diet..  7. Any questions of medical nature, call your physician.    Pending Results     No orders found from 5/27/2018 to 5/30/2018.            Admission Information     Date & Time Provider Department Dept. Phone    5/29/2018 Tyree Mcintyre MD [facility] [phone]      Your Vitals Were     Blood Pressure Temperature Respirations Pulse Oximetry          148/100 97.4  F (36.3  C) (Temporal) 16 97%        MyChart Information     [employer] gives you secure access to your electronic health record. If you see a primary care provider, you can also send messages to your care team and make appointments. If you have questions, please call your primary care clinic.  If you do not have a primary care provider, please call [phone] and they will assist you.        Care EveryWhere ID     This is your Care EveryWhere ID. This could be used by other organizations to access your [facility] medical records  [id number]        Equal Access to Services     HONORIO BELL AH: Hadii franca jean-baptiste  "jayden Arellano, waalfonsoda luqadaha, qaybta kaalcriselda beaulieu, iris delgado laanantkaren trisha. So [facility] [phone].    ATENCIÓN: Si solitariola jeannette, tiene a luis disposición servicios gratuitos de asistencia lingüística. Donya al [phone].    We comply with applicable federal civil rights laws and [place] laws. We do not discriminate on the basis of race, color, national origin, age, disability, sex, sexual orientation, or gender identity.               Review of your medicines      CONTINUE these medicines which have NOT CHANGED        Dose / Directions    allopurinol 100 MG tablet   Commonly known as:  ZYLOPRIM   Used for:  Gout without tophus        Dose:  200 mg   Take 2 tablets (200 mg) by mouth daily   Quantity:  180 tablet   Refills:  3       CALCIUM + D PO        Dose:  2 tablet   Take 2 tablets by mouth daily 600mg + vitamin D   Refills:  0       cetirizine 10 MG tablet   Commonly known as:  zyrTEC        Dose:  10 mg   Take 10 mg by mouth daily.   Refills:  0       cyanocobalamin 1000 MCG/ML injection   Commonly known as:  VITAMIN B12   Used for:  Postoperative malabsorption        inject 1 ml subcutaneous every 45 days   Quantity:  2 mL   Refills:  3       ICAPS AREDS FORMULA PO   Used for:  Bariatric surgery status, Obesity (BMI 30.0-34.9)        Dose:  1 capsule   Take 1 capsule by mouth daily   Refills:  0       levothyroxine 100 MCG tablet   Commonly known as:  SYNTHROID/LEVOTHROID   Used for:  Acquired hypothyroidism        Dose:  100 mcg   Take 1 tablet (100 mcg) by mouth daily   Quantity:  90 tablet   Refills:  3       ranitidine 150 MG tablet   Commonly known as:  ZANTAC   Used for:  Bariatric surgery status, Gastroesophageal reflux disease without esophagitis        Dose:  150 mg   Take 1 tablet (150 mg) by mouth 2 times daily   Quantity:  180 tablet   Refills:  3       syringe/needle (disp) 25G X 1\" 3 ML Misc   Used for:  Bariatric surgery status        Use for B-12 injection " "  Quantity:  3 each   Refills:  3       VITAMIN D (CHOLECALCIFEROL) PO   Used for:  Bariatric surgery status, Obesity, unspecified        Dose:  2000 Units   Take 2,000 Units by mouth daily   Refills:  0                Protect others around you: Learn how to safely use, store and throw away your medicines at www.disposemymeds.org.             Medication List: This is a list of all your medications and when to take them. Check marks below indicate your daily home schedule. Keep this list as a reference.      Medications           Morning Afternoon Evening Bedtime As Needed    allopurinol 100 MG tablet   Commonly known as:  ZYLOPRIM   Take 2 tablets (200 mg) by mouth daily                                CALCIUM + D PO   Take 2 tablets by mouth daily 600mg + vitamin D                                cetirizine 10 MG tablet   Commonly known as:  zyrTEC   Take 10 mg by mouth daily.                                cyanocobalamin 1000 MCG/ML injection   Commonly known as:  VITAMIN B12   inject 1 ml subcutaneous every 45 days                                ICAPS AREDS FORMULA PO   Take 1 capsule by mouth daily                                levothyroxine 100 MCG tablet   Commonly known as:  SYNTHROID/LEVOTHROID   Take 1 tablet (100 mcg) by mouth daily                                ranitidine 150 MG tablet   Commonly known as:  ZANTAC   Take 1 tablet (150 mg) by mouth 2 times daily                                syringe/needle (disp) 25G X 1\" 3 ML Misc   Use for B-12 injection                                VITAMIN D (CHOLECALCIFEROL) PO   Take 2,000 Units by mouth daily                                  "

## 2018-05-29 NOTE — DISCHARGE INSTRUCTIONS
[facility]  Cataract Surgery Discharge Instructions  [facility] MD GINA Ruiz MD J. Hasan, MD C. Nichols, MD J. O'Neill, MD S. Schaefer, MD J. Stephens, MD        Start using drops when you arrive at home today    You may have been prescribed SmartDrops or OneDrop, which is a compound formula drop that combines all three medications in a single drop. This drop should be instilled to the surgical eye 3 times daily until gone.    Place shield over surgical eye at bedtime for 3 nights.      The eye will feel itchy, scratchy, and vision will be blurred, you may take Tylenol for the scratchy feeling if this is bothersome.      No eye rubbing or swimming for I week.      You may resume all prescription medications as directed by your primary doctor.      Call if increasing pain, progressively worsening vision or worsening redness of surgical eye.      On-call doctor can be reached at [phone].        [facility]est[facility] Discharge  Instructions  Anesthesia (Eye Care Center)   Adult Discharge Instructions    For 24 hours after surgery    1. Get plenty of rest.  Make arrangements to have a responsible adult stay with you for at least 6 hours after you leave the hospital.  2. Do not drive or use heavy equipment for 24 hours.    3. Do not drink alcohol for 24 hours.  4. Do not sign legal documents or make important decisions for 24 hours.  5. Avoid strenuous or risky activities. You may feel lightheaded.  If so, sit for a few minutes before standing.  Have someone help you get up.   6. Conscious sedation patients may resume a regular diet..  7. Any questions of medical nature, call your physician.

## 2018-06-13 ENCOUNTER — HOSPITAL ENCOUNTER (INPATIENT)
Facility: CLINIC | Age: 68
LOS: 1 days | Discharge: HOME | DRG: 418 | End: 2018-06-15
Attending: EMERGENCY MEDICINE | Admitting: INTERNAL MEDICINE
Payer: MEDICARE

## 2018-06-13 ENCOUNTER — APPOINTMENT (OUTPATIENT)
Dept: ULTRASOUND IMAGING | Facility: CLINIC | Age: 68
DRG: 418 | End: 2018-06-13
Attending: EMERGENCY MEDICINE
Payer: MEDICARE

## 2018-06-13 DIAGNOSIS — R74.01 TRANSAMINITIS: ICD-10-CM

## 2018-06-13 DIAGNOSIS — K85.10 GALLSTONE PANCREATITIS: ICD-10-CM

## 2018-06-13 LAB
ALBUMIN SERPL-MCNC: 3.8 G/DL (ref 3.4–5)
ALP SERPL-CCNC: 242 U/L (ref 40–150)
ALT SERPL W P-5'-P-CCNC: 1092 U/L (ref 0–50)
ANION GAP SERPL CALCULATED.3IONS-SCNC: 6 MMOL/L (ref 3–14)
AST SERPL W P-5'-P-CCNC: 1821 U/L (ref 0–45)
BASOPHILS # BLD AUTO: 0 10E9/L (ref 0–0.2)
BASOPHILS NFR BLD AUTO: 0.4 %
BILIRUB DIRECT SERPL-MCNC: 0.7 MG/DL (ref 0–0.2)
BILIRUB SERPL-MCNC: 1.2 MG/DL (ref 0.2–1.3)
BUN SERPL-MCNC: 28 MG/DL (ref 7–30)
CALCIUM SERPL-MCNC: 9 MG/DL (ref 8.5–10.1)
CHLORIDE SERPL-SCNC: 108 MMOL/L (ref 94–109)
CO2 SERPL-SCNC: 26 MMOL/L (ref 20–32)
CREAT SERPL-MCNC: 1.15 MG/DL (ref 0.52–1.04)
CRP SERPL-MCNC: <2.9 MG/L (ref 0–8)
DIFFERENTIAL METHOD BLD: ABNORMAL
EOSINOPHIL # BLD AUTO: 0 10E9/L (ref 0–0.7)
EOSINOPHIL NFR BLD AUTO: 0.2 %
ERYTHROCYTE [DISTWIDTH] IN BLOOD BY AUTOMATED COUNT: 13.2 % (ref 10–15)
GFR SERPL CREATININE-BSD FRML MDRD: 47 ML/MIN/1.7M2
GLUCOSE SERPL-MCNC: 152 MG/DL (ref 70–99)
HCT VFR BLD AUTO: 43.7 % (ref 35–47)
HGB BLD-MCNC: 14 G/DL (ref 11.7–15.7)
IMM GRANULOCYTES # BLD: 0 10E9/L (ref 0–0.4)
IMM GRANULOCYTES NFR BLD: 0.3 %
LACTATE BLD-SCNC: 1.6 MMOL/L (ref 0.7–2)
LIPASE SERPL-CCNC: ABNORMAL U/L (ref 73–393)
LYMPHOCYTES # BLD AUTO: 1 10E9/L (ref 0.8–5.3)
LYMPHOCYTES NFR BLD AUTO: 9.7 %
MCH RBC QN AUTO: 32.4 PG (ref 26.5–33)
MCHC RBC AUTO-ENTMCNC: 32 G/DL (ref 31.5–36.5)
MCV RBC AUTO: 101 FL (ref 78–100)
MONOCYTES # BLD AUTO: 0.7 10E9/L (ref 0–1.3)
MONOCYTES NFR BLD AUTO: 6.1 %
NEUTROPHILS # BLD AUTO: 8.9 10E9/L (ref 1.6–8.3)
NEUTROPHILS NFR BLD AUTO: 83.3 %
NRBC # BLD AUTO: 0 10*3/UL
NRBC BLD AUTO-RTO: 0 /100
PLATELET # BLD AUTO: 225 10E9/L (ref 150–450)
POTASSIUM SERPL-SCNC: 3.8 MMOL/L (ref 3.4–5.3)
PROT SERPL-MCNC: 7.6 G/DL (ref 6.8–8.8)
RBC # BLD AUTO: 4.32 10E12/L (ref 3.8–5.2)
SODIUM SERPL-SCNC: 140 MMOL/L (ref 133–144)
WBC # BLD AUTO: 10.7 10E9/L (ref 4–11)

## 2018-06-13 PROCEDURE — 76705 ECHO EXAM OF ABDOMEN: CPT

## 2018-06-13 PROCEDURE — 96375 TX/PRO/DX INJ NEW DRUG ADDON: CPT

## 2018-06-13 PROCEDURE — 80048 BASIC METABOLIC PNL TOTAL CA: CPT | Performed by: EMERGENCY MEDICINE

## 2018-06-13 PROCEDURE — 96374 THER/PROPH/DIAG INJ IV PUSH: CPT

## 2018-06-13 PROCEDURE — 85025 COMPLETE CBC W/AUTO DIFF WBC: CPT | Performed by: EMERGENCY MEDICINE

## 2018-06-13 PROCEDURE — 25000128 H RX IP 250 OP 636: Performed by: EMERGENCY MEDICINE

## 2018-06-13 PROCEDURE — 93005 ELECTROCARDIOGRAM TRACING: CPT

## 2018-06-13 PROCEDURE — 99285 EMERGENCY DEPT VISIT HI MDM: CPT | Mod: 25

## 2018-06-13 PROCEDURE — 83690 ASSAY OF LIPASE: CPT | Performed by: EMERGENCY MEDICINE

## 2018-06-13 PROCEDURE — 96376 TX/PRO/DX INJ SAME DRUG ADON: CPT

## 2018-06-13 PROCEDURE — 25000125 ZZHC RX 250: Performed by: EMERGENCY MEDICINE

## 2018-06-13 PROCEDURE — 83615 LACTATE (LD) (LDH) ENZYME: CPT | Performed by: EMERGENCY MEDICINE

## 2018-06-13 PROCEDURE — 80329 ANALGESICS NON-OPIOID 1 OR 2: CPT | Performed by: EMERGENCY MEDICINE

## 2018-06-13 PROCEDURE — 80076 HEPATIC FUNCTION PANEL: CPT | Performed by: EMERGENCY MEDICINE

## 2018-06-13 PROCEDURE — 86140 C-REACTIVE PROTEIN: CPT | Performed by: EMERGENCY MEDICINE

## 2018-06-13 PROCEDURE — 96361 HYDRATE IV INFUSION ADD-ON: CPT

## 2018-06-13 PROCEDURE — 83605 ASSAY OF LACTIC ACID: CPT | Performed by: EMERGENCY MEDICINE

## 2018-06-13 RX ORDER — ONDANSETRON 2 MG/ML
4 INJECTION INTRAMUSCULAR; INTRAVENOUS ONCE
Status: COMPLETED | OUTPATIENT
Start: 2018-06-13 | End: 2018-06-13

## 2018-06-13 RX ORDER — MORPHINE SULFATE 4 MG/ML
4 INJECTION, SOLUTION INTRAMUSCULAR; INTRAVENOUS ONCE
Status: COMPLETED | OUTPATIENT
Start: 2018-06-13 | End: 2018-06-13

## 2018-06-13 RX ORDER — MORPHINE SULFATE 2 MG/ML
4 INJECTION, SOLUTION INTRAMUSCULAR; INTRAVENOUS ONCE
Status: COMPLETED | OUTPATIENT
Start: 2018-06-13 | End: 2018-06-13

## 2018-06-13 RX ADMIN — ONDANSETRON 4 MG: 2 INJECTION INTRAMUSCULAR; INTRAVENOUS at 22:44

## 2018-06-13 RX ADMIN — SODIUM CHLORIDE 1000 ML: 9 INJECTION, SOLUTION INTRAVENOUS at 22:44

## 2018-06-13 RX ADMIN — MORPHINE SULFATE 4 MG: 4 INJECTION INTRAVENOUS at 22:44

## 2018-06-13 RX ADMIN — MORPHINE SULFATE 4 MG: 2 INJECTION, SOLUTION INTRAMUSCULAR; INTRAVENOUS at 23:39

## 2018-06-13 RX ADMIN — FAMOTIDINE 20 MG: 10 INJECTION INTRAVENOUS at 22:43

## 2018-06-13 NOTE — IP AVS SNAPSHOT
[facility]    [address]    [facility] [address]    Phone:  [phone]    Fax:  [phone]                                       After Visit Summary   6/13/2018    Amy Haq    MRN: [id number]           After Visit Summary Signature Page     I have received my discharge instructions, and my questions have been answered. I have discussed any challenges I see with this plan with the nurse or doctor.    ..........................................................................................................................................  Patient/Patient Representative Signature      ..........................................................................................................................................  Patient Representative Print Name and Relationship to Patient    ..................................................               ................................................  Date                                            Time    ..........................................................................................................................................  Reviewed by Signature/Title    ...................................................              ..............................................  Date                                                            Time

## 2018-06-14 ENCOUNTER — APPOINTMENT (OUTPATIENT)
Dept: MRI IMAGING | Facility: CLINIC | Age: 68
DRG: 418 | End: 2018-06-14
Attending: PHYSICIAN ASSISTANT
Payer: MEDICARE

## 2018-06-14 ENCOUNTER — APPOINTMENT (OUTPATIENT)
Dept: ULTRASOUND IMAGING | Facility: CLINIC | Age: 68
DRG: 418 | End: 2018-06-14
Attending: PHYSICIAN ASSISTANT
Payer: MEDICARE

## 2018-06-14 PROBLEM — K85.90 PANCREATITIS: Status: ACTIVE | Noted: 2018-06-14

## 2018-06-14 LAB
ALBUMIN SERPL-MCNC: 3.1 G/DL (ref 3.4–5)
ALP SERPL-CCNC: 226 U/L (ref 40–150)
ALT SERPL W P-5'-P-CCNC: 1248 U/L (ref 0–50)
ANION GAP SERPL CALCULATED.3IONS-SCNC: 6 MMOL/L (ref 3–14)
APAP SERPL-MCNC: <2 MG/L (ref 10–20)
AST SERPL W P-5'-P-CCNC: 1308 U/L (ref 0–45)
BASOPHILS # BLD AUTO: 0 10E9/L (ref 0–0.2)
BASOPHILS NFR BLD AUTO: 0.6 %
BILIRUB SERPL-MCNC: 0.7 MG/DL (ref 0.2–1.3)
BUN SERPL-MCNC: 21 MG/DL (ref 7–30)
CALCIUM SERPL-MCNC: 8.2 MG/DL (ref 8.5–10.1)
CHLORIDE SERPL-SCNC: 114 MMOL/L (ref 94–109)
CHOLEST SERPL-MCNC: 153 MG/DL
CMV IGG SERPL QL IA: <0.2 AI (ref 0–0.8)
CO2 SERPL-SCNC: 25 MMOL/L (ref 20–32)
CREAT SERPL-MCNC: 1.11 MG/DL (ref 0.52–1.04)
DIFFERENTIAL METHOD BLD: NORMAL
EOSINOPHIL # BLD AUTO: 0.2 10E9/L (ref 0–0.7)
EOSINOPHIL NFR BLD AUTO: 2.5 %
ERYTHROCYTE [DISTWIDTH] IN BLOOD BY AUTOMATED COUNT: 13.2 % (ref 10–15)
GFR SERPL CREATININE-BSD FRML MDRD: 49 ML/MIN/1.7M2
GLUCOSE BLDC GLUCOMTR-MCNC: 85 MG/DL (ref 70–99)
GLUCOSE BLDC GLUCOMTR-MCNC: 87 MG/DL (ref 70–99)
GLUCOSE BLDC GLUCOMTR-MCNC: 87 MG/DL (ref 70–99)
GLUCOSE BLDC GLUCOMTR-MCNC: 95 MG/DL (ref 70–99)
GLUCOSE BLDC GLUCOMTR-MCNC: 98 MG/DL (ref 70–99)
GLUCOSE SERPL-MCNC: 89 MG/DL (ref 70–99)
HAV IGG SER QL IA: NONREACTIVE
HBV CORE IGM SERPL QL IA: NONREACTIVE
HBV SURFACE AG SERPL QL IA: NONREACTIVE
HCT VFR BLD AUTO: 40.1 % (ref 35–47)
HCV AB SERPL QL IA: NONREACTIVE
HDLC SERPL-MCNC: 61 MG/DL
HGB BLD-MCNC: 12.9 G/DL (ref 11.7–15.7)
IMM GRANULOCYTES # BLD: 0 10E9/L (ref 0–0.4)
IMM GRANULOCYTES NFR BLD: 0.5 %
INTERPRETATION ECG - MUSE: NORMAL
LDH SERPL L TO P-CCNC: 1416 U/L (ref 81–234)
LDLC SERPL CALC-MCNC: 80 MG/DL
LIPASE SERPL-CCNC: 3650 U/L (ref 73–393)
LYMPHOCYTES # BLD AUTO: 1.5 10E9/L (ref 0.8–5.3)
LYMPHOCYTES NFR BLD AUTO: 23.1 %
MCH RBC QN AUTO: 32.3 PG (ref 26.5–33)
MCHC RBC AUTO-ENTMCNC: 32.2 G/DL (ref 31.5–36.5)
MCV RBC AUTO: 100 FL (ref 78–100)
MONOCYTES # BLD AUTO: 0.5 10E9/L (ref 0–1.3)
MONOCYTES NFR BLD AUTO: 8.5 %
NEUTROPHILS # BLD AUTO: 4.1 10E9/L (ref 1.6–8.3)
NEUTROPHILS NFR BLD AUTO: 64.8 %
NONHDLC SERPL-MCNC: 92 MG/DL
NRBC # BLD AUTO: 0 10*3/UL
NRBC BLD AUTO-RTO: 0 /100
PLATELET # BLD AUTO: 194 10E9/L (ref 150–450)
POTASSIUM SERPL-SCNC: 4.3 MMOL/L (ref 3.4–5.3)
PROT SERPL-MCNC: 6.6 G/DL (ref 6.8–8.8)
RBC # BLD AUTO: 4 10E12/L (ref 3.8–5.2)
SODIUM SERPL-SCNC: 145 MMOL/L (ref 133–144)
TRIGL SERPL-MCNC: 62 MG/DL
WBC # BLD AUTO: 6.3 10E9/L (ref 4–11)

## 2018-06-14 PROCEDURE — 36415 COLL VENOUS BLD VENIPUNCTURE: CPT | Performed by: PHYSICIAN ASSISTANT

## 2018-06-14 PROCEDURE — A9585 GADOBUTROL INJECTION: HCPCS | Performed by: RADIOLOGY

## 2018-06-14 PROCEDURE — 80053 COMPREHEN METABOLIC PANEL: CPT | Performed by: INTERNAL MEDICINE

## 2018-06-14 PROCEDURE — 85025 COMPLETE CBC W/AUTO DIFF WBC: CPT | Performed by: INTERNAL MEDICINE

## 2018-06-14 PROCEDURE — 74183 MRI ABD W/O CNTR FLWD CNTR: CPT

## 2018-06-14 PROCEDURE — 87340 HEPATITIS B SURFACE AG IA: CPT | Performed by: PHYSICIAN ASSISTANT

## 2018-06-14 PROCEDURE — 99223 1ST HOSP IP/OBS HIGH 75: CPT | Mod: AI | Performed by: INTERNAL MEDICINE

## 2018-06-14 PROCEDURE — 25000128 H RX IP 250 OP 636: Performed by: RADIOLOGY

## 2018-06-14 PROCEDURE — 83690 ASSAY OF LIPASE: CPT | Performed by: INTERNAL MEDICINE

## 2018-06-14 PROCEDURE — 86644 CMV ANTIBODY: CPT | Performed by: PHYSICIAN ASSISTANT

## 2018-06-14 PROCEDURE — 00000146 ZZHCL STATISTIC GLUCOSE BY METER IP

## 2018-06-14 PROCEDURE — 86705 HEP B CORE ANTIBODY IGM: CPT | Performed by: PHYSICIAN ASSISTANT

## 2018-06-14 PROCEDURE — 86038 ANTINUCLEAR ANTIBODIES: CPT | Performed by: PHYSICIAN ASSISTANT

## 2018-06-14 PROCEDURE — 25000128 H RX IP 250 OP 636: Performed by: INTERNAL MEDICINE

## 2018-06-14 PROCEDURE — 12000000 ZZH R&B MED SURG/OB

## 2018-06-14 PROCEDURE — 76705 ECHO EXAM OF ABDOMEN: CPT | Mod: XS

## 2018-06-14 PROCEDURE — 36415 COLL VENOUS BLD VENIPUNCTURE: CPT | Performed by: INTERNAL MEDICINE

## 2018-06-14 PROCEDURE — 86708 HEPATITIS A ANTIBODY: CPT | Performed by: PHYSICIAN ASSISTANT

## 2018-06-14 PROCEDURE — A9270 NON-COVERED ITEM OR SERVICE: HCPCS | Mod: GY | Performed by: INTERNAL MEDICINE

## 2018-06-14 PROCEDURE — 80061 LIPID PANEL: CPT | Performed by: INTERNAL MEDICINE

## 2018-06-14 PROCEDURE — 86803 HEPATITIS C AB TEST: CPT | Performed by: PHYSICIAN ASSISTANT

## 2018-06-14 PROCEDURE — 25000132 ZZH RX MED GY IP 250 OP 250 PS 637: Mod: GY | Performed by: INTERNAL MEDICINE

## 2018-06-14 RX ORDER — PREDNISOLONE ACETATE 10 MG/ML
1 SUSPENSION/ DROPS OPHTHALMIC 3 TIMES DAILY
COMMUNITY
End: 2019-05-22

## 2018-06-14 RX ORDER — LIDOCAINE 40 MG/G
CREAM TOPICAL
Status: DISCONTINUED | OUTPATIENT
Start: 2018-06-14 | End: 2018-06-15 | Stop reason: HOSPADM

## 2018-06-14 RX ORDER — BISACODYL 5 MG
5 TABLET, DELAYED RELEASE (ENTERIC COATED) ORAL DAILY PRN
Status: DISCONTINUED | OUTPATIENT
Start: 2018-06-14 | End: 2018-06-15 | Stop reason: HOSPADM

## 2018-06-14 RX ORDER — PREDNISOLONE ACETATE 10 MG/ML
1 SUSPENSION/ DROPS OPHTHALMIC 3 TIMES DAILY
Status: DISCONTINUED | OUTPATIENT
Start: 2018-06-14 | End: 2018-06-15 | Stop reason: HOSPADM

## 2018-06-14 RX ORDER — BISACODYL 5 MG
15 TABLET, DELAYED RELEASE (ENTERIC COATED) ORAL DAILY PRN
Status: DISCONTINUED | OUTPATIENT
Start: 2018-06-14 | End: 2018-06-15 | Stop reason: HOSPADM

## 2018-06-14 RX ORDER — BISACODYL 10 MG
10 SUPPOSITORY, RECTAL RECTAL DAILY PRN
Status: DISCONTINUED | OUTPATIENT
Start: 2018-06-14 | End: 2018-06-15 | Stop reason: HOSPADM

## 2018-06-14 RX ORDER — OXYCODONE HYDROCHLORIDE 5 MG/1
5-10 TABLET ORAL EVERY 6 HOURS PRN
Status: DISCONTINUED | OUTPATIENT
Start: 2018-06-14 | End: 2018-06-15 | Stop reason: HOSPADM

## 2018-06-14 RX ORDER — ONDANSETRON 4 MG/1
4 TABLET, ORALLY DISINTEGRATING ORAL EVERY 6 HOURS PRN
Status: DISCONTINUED | OUTPATIENT
Start: 2018-06-14 | End: 2018-06-15 | Stop reason: HOSPADM

## 2018-06-14 RX ORDER — ALLOPURINOL 100 MG/1
200 TABLET ORAL DAILY
Status: DISCONTINUED | OUTPATIENT
Start: 2018-06-14 | End: 2018-06-15 | Stop reason: HOSPADM

## 2018-06-14 RX ORDER — SODIUM CHLORIDE 9 MG/ML
INJECTION, SOLUTION INTRAVENOUS CONTINUOUS
Status: DISCONTINUED | OUTPATIENT
Start: 2018-06-14 | End: 2018-06-15 | Stop reason: HOSPADM

## 2018-06-14 RX ORDER — BISACODYL 5 MG
10 TABLET, DELAYED RELEASE (ENTERIC COATED) ORAL DAILY PRN
Status: DISCONTINUED | OUTPATIENT
Start: 2018-06-14 | End: 2018-06-15 | Stop reason: HOSPADM

## 2018-06-14 RX ORDER — LEVOTHYROXINE SODIUM 100 UG/1
100 TABLET ORAL DAILY
Status: DISCONTINUED | OUTPATIENT
Start: 2018-06-14 | End: 2018-06-15 | Stop reason: HOSPADM

## 2018-06-14 RX ORDER — MORPHINE SULFATE 4 MG/ML
2-4 INJECTION, SOLUTION INTRAMUSCULAR; INTRAVENOUS
Status: DISCONTINUED | OUTPATIENT
Start: 2018-06-14 | End: 2018-06-15 | Stop reason: HOSPADM

## 2018-06-14 RX ORDER — DEXTROSE MONOHYDRATE 25 G/50ML
25-50 INJECTION, SOLUTION INTRAVENOUS
Status: DISCONTINUED | OUTPATIENT
Start: 2018-06-14 | End: 2018-06-15 | Stop reason: HOSPADM

## 2018-06-14 RX ORDER — NALOXONE HYDROCHLORIDE 0.4 MG/ML
.1-.4 INJECTION, SOLUTION INTRAMUSCULAR; INTRAVENOUS; SUBCUTANEOUS
Status: DISCONTINUED | OUTPATIENT
Start: 2018-06-14 | End: 2018-06-15 | Stop reason: HOSPADM

## 2018-06-14 RX ORDER — GADOBUTROL 604.72 MG/ML
10 INJECTION INTRAVENOUS ONCE
Status: COMPLETED | OUTPATIENT
Start: 2018-06-14 | End: 2018-06-14

## 2018-06-14 RX ORDER — NICOTINE POLACRILEX 4 MG
15-30 LOZENGE BUCCAL
Status: DISCONTINUED | OUTPATIENT
Start: 2018-06-14 | End: 2018-06-15 | Stop reason: HOSPADM

## 2018-06-14 RX ORDER — ONDANSETRON 2 MG/ML
4 INJECTION INTRAMUSCULAR; INTRAVENOUS EVERY 6 HOURS PRN
Status: DISCONTINUED | OUTPATIENT
Start: 2018-06-14 | End: 2018-06-15 | Stop reason: HOSPADM

## 2018-06-14 RX ADMIN — GADOBUTROL 9 ML: 604.72 INJECTION INTRAVENOUS at 16:07

## 2018-06-14 RX ADMIN — LEVOTHYROXINE SODIUM 100 MCG: 100 TABLET ORAL at 17:52

## 2018-06-14 RX ADMIN — SODIUM CHLORIDE: 9 INJECTION, SOLUTION INTRAVENOUS at 01:08

## 2018-06-14 RX ADMIN — PREDNISOLONE ACETATE 1 DROP: 10 SUSPENSION/ DROPS OPHTHALMIC at 15:48

## 2018-06-14 RX ADMIN — ALLOPURINOL 200 MG: 100 TABLET ORAL at 17:52

## 2018-06-14 RX ADMIN — RANITIDINE 150 MG: 150 TABLET ORAL at 20:25

## 2018-06-14 RX ADMIN — MORPHINE SULFATE 2 MG: 4 INJECTION INTRAVENOUS at 01:37

## 2018-06-14 RX ADMIN — PREDNISOLONE ACETATE 1 DROP: 10 SUSPENSION/ DROPS OPHTHALMIC at 18:49

## 2018-06-14 ASSESSMENT — ENCOUNTER SYMPTOMS
DYSURIA: 0
NAUSEA: 1
DIARRHEA: 0
VOMITING: 0
FEVER: 0
FREQUENCY: 0
ABDOMINAL PAIN: 1

## 2018-06-14 NOTE — ED NOTES
[facility]  ED Nurse Handoff Report    Amy Haq is a 67 year old female   ED Chief complaint: Abdominal Pain  . ED Diagnosis:   Final diagnoses:   Gallstone pancreatitis     Allergies:   Allergies   Allergen Reactions     Lisinopril      Cough.       Code Status: Full Code  Activity level - Baseline/Home:  Independent. Activity Level - Current:   Stand with Assist. Lift room needed: No. Bariatric: No   Needed: No   Isolation: No. Infection: Not Applicable.     Vital Signs:   Vitals:    06/13/18 2204 06/13/18 2212 06/13/18 2323   BP: (!) 194/97  (!) 183/97   Pulse: 79     Resp: 18     Temp:  98.1  F (36.7  C)    TempSrc:  Oral    SpO2: 98%         Cardiac Rhythm:  ,      Pain level:    Patient confused: No. Patient Falls Risk: Yes.   Elimination Status: Has voided   Patient Report - Initial Complaint: Abdominal pain .   Focused Assessment: Gastrointestinal - GI WDL:  WDL except Gastrointestinal Comment: Pt complains of upper abdominal pain that begins in her RUQ and moves around to her back. Pt states that this has been going on for the past 6 hours with no relief. Pt complains of mild nasuea as well as mild SOB.    Abnormal Results:   Labs Ordered and Resulted from Time of ED Arrival Up to the Time of Departure from the ED   LIPASE - Abnormal; Notable for the following:        Result Value    Lipase 29658 (*)     All other components within normal limits   BASIC METABOLIC PANEL - Abnormal; Notable for the following:     Glucose 152 (*)     Creatinine 1.15 (*)     GFR Estimate 47 (*)     GFR Estimate If Black 57 (*)     All other components within normal limits   CBC WITH PLATELETS DIFFERENTIAL - Abnormal; Notable for the following:      (*)     Absolute Neutrophil 8.9 (*)     All other components within normal limits   HEPATIC PANEL - Abnormal; Notable for the following:     Bilirubin Direct 0.7 (*)     Alkaline Phosphatase 242 (*)     ALT 1092 (*)     AST 1821 (*)     All  other components within normal limits   CRP INFLAMMATION   LACTIC ACID WHOLE BLOOD   LACTATE DEHYDROGENASE   PERIPHERAL IV CATHETER     US Abdomen Limited   Preliminary Result   IMPRESSION:   1. A few small gallstones in the gallbladder. No evidence of acute   cholecystitis.   2. No biliary dilatation.   3. Unremarkable appearance of the liver.        Family Comments: SO at bedside.  OBS brochure/video discussed/provided to patient:  No  ED Medications:   Medications   0.9% sodium chloride BOLUS (1,000 mLs Intravenous New Bag 6/13/18 2244)   morphine (PF) injection 4 mg (4 mg Intravenous Given 6/13/18 2339)   morphine (PF) injection 4 mg (4 mg Intravenous Given 6/13/18 2244)   ondansetron (ZOFRAN) injection 4 mg (4 mg Intravenous Given 6/13/18 2244)   famotidine (PEPCID) injection 20 mg (20 mg Intravenous Given 6/13/18 2243)     Drips infusing:  No  For the majority of the shift, the patient's behavior Green. Interventions performed were na.     Severe Sepsis OR Septic Shock Diagnosis Present: No      ED Nurse Name/Phone Number: Carlosozzie Oneil,   11:42 PM    RECEIVING UNIT ED HANDOFF REVIEW    Above ED Nurse Handoff Report was reviewed: Yes  Reviewed by: Elle Mckeon on June 14, 2018 at 12:36 AM

## 2018-06-14 NOTE — CONSULTS
GASTROENTEROLOGY CONSULTATION      Amy Haq  [address] [place] [address]  67 year old female     Admission Date/Time: 6/13/2018  Primary Care Provider: Francisca Sainz  Referring / Attending Physician:  Dr. Bird     We were asked to see the patient in consultation by Dr. Bird for evaluation of elevated LFTs and epigastric pain.        HPI:  Amy Haq is a 67 year old female with history of gastric bypass, gout with a flare last week, GERD, CKD and cataracts s/p surgery May 29th who is admitted with epigastric pain. She reports the pain started in the afternoon and describes is as a severe, sharp pain radiating through her back and to the RUQ. She had diaphoresis and avoided taking deep breaths due to worsening the pain. They pain has resolved this morning. She reports a few similar episodes that have been occurring over the last 6 weeks but those episodes were less severe and resolved in about 1 hour. She reports stools have been normal other than one day of diarrhea last week. She has been tolerating her diet and denies unintentional weight loss.She denies fevers, chills.    Upon admission she was noted to have significant elevation of LFTs. AST this morning 1308, ALT 1245, T bili 0.7, alk phos 226. US showed a few gall stones but normal appearance of the liver and no biliary dilatation. She took 2 tylenols on Sunday but denies any other tylenol use. She drinks very rarely with last drink 3 weeks ago. She camps every week and noticed a bite on her leg this past weekend that she described as baseball size and red but not raised and not in a bullseye pattern. She denies any new medications other than eye drops and using a grapefruit supplement during gout flare last week. She denies chronic liver disease and reports having pre-operative blood work in late May with normal LFTs.       PAST MEDICAL HISTORY:  Patient Active Problem List    Diagnosis Date Noted     Pancreatitis  06/14/2018     Priority: Medium     Hypothyroidism, unspecified type 05/16/2018     Priority: Medium     Obesity (BMI 30.0-34.9) 12/07/2016     Priority: Medium     Gout without tophus 05/09/2016     Priority: Medium     Gastroesophageal reflux disease without esophagitis 05/09/2016     Priority: Medium     Bariatric surgery status 12/09/2014     Priority: Medium     Advanced directives, counseling/discussion 02/16/2012     Priority: Medium     Discussed Advance Directive planning with patient; information given to patient to review.         Hyperlipidemia LDL goal <100 03/15/2011     Priority: Medium     CARDIOVASCULAR SCREENING; LDL GOAL LESS THAN 100 10/31/2010     Priority: Medium     Essential hypertension 10/31/2007     Priority: Medium     Problem list name updated by automated process. Provider to review       Hyperparathyroidism (H) 10/31/2007     Priority: Medium     Problem list name updated by automated process. Provider to review       Disorder resulting from impaired renal function 10/31/2007     Priority: Medium     Problem list name updated by automated process. Provider to review            ROS: A comprehensive ten point review of systems was negative aside from those in mentioned in the HPI.       MEDICATIONS:   Prior to Admission medications    Medication Sig Start Date End Date Taking? Authorizing Provider   allopurinol (ZYLOPRIM) 100 MG tablet Take 2 tablets (200 mg) by mouth daily 5/16/18  Yes Francisca Sainz APRN CNP   Calcium Carbonate-Vitamin D (CALCIUM + D PO) Take 1 tablet by mouth 2 times daily 600mg + vitamin D   Yes Unknown, Entered By History   cetirizine (ZYRTEC) 10 MG tablet Take 10 mg by mouth daily.   Yes Reported, Patient   cyanocobalamin (VITAMIN B12) 1000 MCG/ML injection inject 1 ml subcutaneous every 45 days 5/16/18  Yes Francisca Sainz APRN CNP   levothyroxine (SYNTHROID/LEVOTHROID) 100 MCG tablet Take 1 tablet (100 mcg) by mouth daily 5/18/18  Yes Francisca Sainz  "Polina, APRN CNP   Multiple Vitamins-Minerals (ICAPS AREDS FORMULA PO) Take 1 capsule by mouth daily   Yes Reported, Patient   Multiple Vitamins-Minerals (OCUVITE PRESERVISION PO) Take 1 tablet by mouth 2 times daily   Yes Unknown, Entered By History   prednisolon-gatiflox-bromfenac, pt own, no charge, 1-0.5-0.075 % opthalmic solution Place 1 drop into the right eye 3 times daily Pt to stop after current bottle used up   Yes Unknown, Entered By History   prednisoLONE acetate (PRED FORTE) 1 % ophthalmic susp Place 1 drop into the right eye 3 times daily   Yes Unknown, Entered By History   ranitidine (ZANTAC) 150 MG tablet Take 1 tablet (150 mg) by mouth 2 times daily 5/16/18  Yes Francisca Sainz APRN CNP   VITAMIN D, CHOLECALCIFEROL, PO Take 2,000 Units by mouth daily as needed    Yes Reported, Patient   syringe/needle, disp, 25G X 1\" 3 ML MISC Use for B-12 injection 5/16/18   Francisca Sainz APRN CNP        ALLERGIES:   Allergies   Allergen Reactions     Lisinopril      Cough.        SOCIAL HISTORY:  Social History   Substance Use Topics     Smoking status: Former Smoker     Packs/day: 0.50     Years: 35.00     Types: Cigarettes     Quit date: 9/26/2006     Smokeless tobacco: Never Used     Alcohol use Yes      Comment: occasionally   She is a nurse.     FAMILY HISTORY:  Family History   Problem Relation Age of Onset     CEREBROVASCULAR DISEASE Mother      born 1923 at age 76     Family History Negative Father      born 1923     Alzheimer Disease Father      Family History Negative Brother      Family History Negative Sister      Family History Negative Daughter    Father- alcoholism, cirrhosis     PHYSICAL EXAM:     /75 (BP Location: Right arm)  Pulse 79  Temp 96.7  F (35.9  C) (Oral)  Resp 18  Wt 95.3 kg (210 lb)  SpO2 95%  BMI 34.15 kg/m2     PHYSICAL EXAM:  GENERAL: No acute distress  SKIN: small raised bump on back of neck but no other suspicious rashes on exposed skin  HEAD: Normocephalic. " Atraumatic.  NECK: Neck supple. No adenopathy.   EYES: No scleral icterus  RESPIRATORY: Good transmission. CTA bilaterally.   CARDIOVASCULAR: RRR, normal S1, S2,  No murmur appreciated  GASTROINTESTINAL: +BS, soft, non tender, non distended, no hepatosplenomegaly, no masses/guarding/rebound  JOINT/EXTREMITIES:  no gross deformities noted, normal muscle tone  NEURO: CN 2-12 grossly intact, no focal deficits  PSYCH: Normal affect              ADDITIONAL COMMENTS:   I reviewed the patient's new clinical lab test results.   Recent Labs   Lab Test  06/14/18 0619 06/13/18 2205 05/16/18 0948   05/28/14   0814   WBC  6.3  10.7  6.3   < >   --    HGB  12.9  14.0  13.0   < >   --    MCV  100  101*  99   < >   --    PLT  194  225  222   < >   --    INR   --    --    --    --   1.01    < > = values in this interval not displayed.     Recent Labs   Lab Test  06/14/18 0619 06/13/18 2205 05/16/18   0948   POTASSIUM  4.3  3.8  3.9   CHLORIDE  114*  108  114*   CO2  25  26  24   BUN  21  28  21   ANIONGAP  6  6  7     Recent Labs   Lab Test  06/14/18 0619 06/13/18 2205 05/16/18   0948  10/19/17   1026   ALBUMIN  3.1*  3.8  3.5   --    BILITOTAL  0.7  1.2  0.8   --    ALT  1248*  1092*  40   --    AST  1308*  1821*  21   --    PROTEIN   --    --    --   30*   LIPASE   --   45344*   --    --         IMAGING / ENDOSCOPY     ULTRASOUND ABDOMEN LIMITED RIGHT UPPER QUADRANT  6/13/2018 11:18 PM      HISTORY: Right upper quadrant pain.     COMPARISON: None.     FINDINGS: Normal hepatic echogenicity. No hepatic masses. A few small  gallstones are present within the gallbladder. No gallbladder wall  thickening, pericholecystic fluid or focal tenderness over the  gallbladder. No intra- or extrahepatic biliary dilatation. The right  kidney has normal size and echogenicity, measuring 10.8 cm in length.  Mild diffuse cortical thinning of the right kidney. No right  intrarenal collecting system dilatation, calculi or masses. No  free  fluid in the upper right hemiabdomen.         IMPRESSION:  1. A few small gallstones in the gallbladder. No evidence of acute  cholecystitis.  2. No biliary dilatation.  3. Unremarkable appearance of the liver.     CONSULTATION ASSESSMENT AND PLAN:    Amy Haq is a 67 year old admitted with epigastric pain and found to have elevated LFTs. She had an US with normal liver, gall stones but no ductal dilation. The LFTs are not consistent with cholestatic pattern and more with hepatocellular pattern with AST/ALT in 1000's normal T bili. Acetaminophen level negative. No recent alcohol use. Medications reviewed notable for allopurinol, cetirizine, ranitidine all with possible hepatotoxicity but no recent changes to her medications other than an eye drop and grape fruit supplement. Other considerations would be an infectious or autoimmune process. She denies hypotensive episode making shock liver less likely.    Is is unclear if her previous pain episodes are related to the liver issue. She has had a few similar but more mild episodes over the past 6 weeks. She reports normal LFTs in late May.     -Hold hepatotoxic medications including avoiding the grapefruit supplement  -Check further labs including hepatitis panel, CMV, EBV, and SHERRILL  -Check INR.  -Trend LFTs  -US with doppler, if negative then MRCP      Case will be discussed with Dr. Dozier who will also see the patient today. Thank you for asking us to participate in the care of this patient.    Sherry Lazaro PA-C  [facility]    -------------------   I agree with the assessment and plan of Sherry Lazaro.  Patient reports her abdominal pain has now resolved since 1:30 this morning when she took her last dose of morphine.  She is feeling hungry.  Abd is benign, no pain to palpation.  Likely gallstone pancreatitis, although typical not to see such high AST/ALT it is not unheard of.  US with gallstones, US dopplers normal.  Will have MRCP today  and if positive for stone at [facility] will likely need to transfer to [facility] for surgery assisted ERCP given her gastric bypass anatomy.  If MRCP is negative then likely passed stone and expect her LFTs to continue to improve and should be evaluated by surgery for shamar.  Agree with checking other causes for abnormal LFTs as skylar has delineated above.  We will continue to follow.    Jose Dozier MD  [place]

## 2018-06-14 NOTE — ED TRIAGE NOTES
Patient presents with complaints of upper abdomen pain that wraps around her back. Pain has been constant for the past several hours. Patient states she has had similar episodes in the past but they usually resolve on its own. Pain is also associated with some nausea. ABC intact without need for intervention at this time.

## 2018-06-14 NOTE — PROGRESS NOTES
Chart reviewed.  Patient seen and reexamined.  Agree with assessment and plan as outlined by my colleague, Dr. Bird.  Abdominal pain is otherwise controlled and no significant nausea.  GI input also greatly appreciated.  Do agree that this is unlikely biliary in nature but would not explain this degree of pancreatitis.  No new medications and she was only using topical grapefruit while to her feet.  Duplex ultrasound of the liver does not demonstrate any vascular anomaly/occlusion.  Agree with sending viral hepatitides studies.  MRCP also ordered per GI.

## 2018-06-14 NOTE — ED PROVIDER NOTES
"  History     Chief Complaint:  Abdominal Pain       The history is provided by the patient.      Amy Haq is a 67 year old female with history of hypothyroidism and gout who presents with abdominal pain.  She reports onset of abdominal pain approximately 6 hours prior to arrival.  She notes she had eaten a Dangelo sandwich for lunch and then nuts just prior to onset of pain.  She reports pain was initially very severe and still remains at least moderate in intensity.  She is unable to describe the quality of pain stating it is \"just constant.\"  She notes it radiates towards her back and she has had associated nausea without vomiting.  She has had no fever or diarrhea, urinary symptoms, chest pain, or other concerns.  She notes no exacerbating factors and has not had no relief with positional changes or Rolaids. She has a a few episodes of similar pain in the past that typically resolve spontaneously after an hour or so. Of note, she has a history of gastric bypass and no history of cholecystectomy.    Allergies:  Lisinopril     Medications:    allopurinol (ZYLOPRIM) 100 MG tablet   Calcium Carbonate-Vitamin D (CALCIUM + D PO)   cetirizine (ZYRTEC) 10 MG tablet   cyanocobalamin (VITAMIN B12) 1000 MCG/ML injection   levothyroxine (SYNTHROID/LEVOTHROID) 100 MCG tablet   Multiple Vitamins-Minerals (ICAPS AREDS FORMULA PO)   ranitidine (ZANTAC) 150 MG tablet   VITAMIN D, CHOLECALCIFEROL, PO     Past Medical History:    Past Medical History:   Diagnosis Date     Esophageal reflux      Hypertension - resolved after bariatric surgery      Numbness and tingling      Renal disease      Hypothyroidism    Gout  Obesity, s/p bariatric surgery  HLD on no current statin  Hyperparathyroidism    Past Surgical History:    Past Surgical History:   Procedure Laterality Date     APPENDECTOMY       COLONOSCOPY       EXCISE LESION BACK N/A 12/3/2014    Procedure: EXCISE LESION BACK;  Surgeon: Elvin Patiño MD;  Location: "  OR     HEAD & NECK SURGERY      Parathyriod tumor removal     HYSTERECTOMY, PAP NO LONGER INDICATED       LAPAROSCOPIC BYPASS GASTRIC N/A 12/3/2014    Procedure: LAPAROSCOPIC BYPASS GASTRIC;  Surgeon: Elvin Patiño MD;  Location:  OR     LAPAROSCOPIC LYSIS ADHESIONS N/A 12/3/2014    Procedure: LAPAROSCOPIC LYSIS ADHESIONS;  Surgeon: Elvin Patiño MD;  Location:  OR     PHACOEMULSIFICATION CLEAR CORNEA WITH STANDARD INTRAOCULAR LENS IMPLANT Right 5/29/2018    Procedure: PHACOEMULSIFICATION CLEAR CORNEA WITH STANDARD INTRAOCULAR LENS IMPLANT;  RIGHT EYE PHACOEMULSIFICATION CLEAR CORNEA WITH STANDARD INTRAOCULAR LENS IMPLANT ;  Surgeon: Tyree Mcintyre MD;  Location:  [facility]        Family History:    family history includes Alzheimer Disease in her father; CEREBROVASCULAR DISEASE in her mother; Family History Negative in her brother, daughter, father, and sister.    Social History:  Former smoker  Retired OB RN  Presents with significant other.  PCP: Francisca Sainz     Review of Systems   Constitutional: Negative for fever.   Cardiovascular: Negative for chest pain.   Gastrointestinal: Positive for abdominal pain and nausea. Negative for diarrhea and vomiting.   Genitourinary: Negative for dysuria, frequency and urgency.   All other systems reviewed and are negative.        Physical Exam     Patient Vitals for the past 24 hrs:   BP Temp Temp src Pulse Heart Rate Resp SpO2 Weight   06/14/18 0100 165/66 98.5  F (36.9  C) Oral - 72 18 - 95.3 kg (210 lb)   06/13/18 2323 (!) 183/97 - - - - - - -   06/13/18 2212 - 98.1  F (36.7  C) Oral - - - - -   06/13/18 2204 (!) 194/97 - - 79 79 18 98 % -        Physical Exam  General: Well-developed and well-nourished. Well appearing elderly  woman. Cooperative.  Head:  Atraumatic.  Eyes:  Conjunctivae, lids, and sclerae are normal.  ENT:    Normal nose. Moist mucous membranes.  Neck:  Supple. Normal range of motion.  CV:  Regular rate and  rhythm. Normal heart sounds with no murmurs, rubs, or gallops detected.  Resp:  No respiratory distress. Clear to auscultation bilaterally without decreased breath sounds, wheezing, rales, or rhonchi.  GI:  Soft. Non-distended. Epigastric and RUQ pain. Negative Valdez's.    MS:  Normal ROM.   Skin:  Warm. Non-diaphoretic. No pallor.  Neuro:  Awake. A&Ox3. Normal strength.  Psych: Normal mood and affect. Normal speech.  Vitals reviewed.    Emergency Department Course     EKG  Indication: Epigastric pain  Time: 22:11  Rate 78 bpm. AL interval 178. QRS duration 74. QT/QTc 378/430.   Normal sinus rhythm  Normal ECG  No acute ST changes.  No change as compared to prior, dated 5/16/18.    Imaging:  US Abdomen Limited   Final Result   IMPRESSION:   1. A few small gallstones in the gallbladder. No evidence of acute   cholecystitis.   2. No biliary dilatation.   3. Unremarkable appearance of the liver.      IRVIN MAR MD     Laboratory: Abnormal values below only. See Epic for all others.     Labs Ordered and Resulted from Time of ED Arrival Up to the Time of Departure from the ED   LIPASE - Abnormal; Notable for the following:        Result Value    Lipase 22844 (*)     All other components within normal limits   BASIC METABOLIC PANEL - Abnormal; Notable for the following:     Glucose 152 (*)     Creatinine 1.15 (*)     GFR Estimate 47 (*)     GFR Estimate If Black 57 (*)     All other components within normal limits   CBC WITH PLATELETS DIFFERENTIAL - Abnormal; Notable for the following:      (*)     Absolute Neutrophil 8.9 (*)     All other components within normal limits   HEPATIC PANEL - Abnormal; Notable for the following:     Bilirubin Direct 0.7 (*)     Alkaline Phosphatase 242 (*)     ALT 1092 (*)     AST 1821 (*)     All other components within normal limits   LACTATE DEHYDROGENASE - Abnormal; Notable for the following:     Lactate Dehydrogenase 1416 (*)     All other components within normal limits  "  CRP INFLAMMATION   LACTIC ACID WHOLE BLOOD      Interventions:  Medications   0.9% sodium chloride BOLUS (0 mLs Intravenous Stopped 6/14/18 0001)   morphine (PF) injection 4 mg (4 mg Intravenous Given 6/13/18 2244)   ondansetron (ZOFRAN) injection 4 mg (4 mg Intravenous Given 6/13/18 2244)   famotidine (PEPCID) injection 20 mg (20 mg Intravenous Given 6/13/18 2243)   morphine (PF) injection 4 mg (4 mg Intravenous Given 6/13/18 2339)      Emergency Department Course:  Past medical records, nursing notes, and vitals reviewed.  I performed an exam of the patient and obtained history, as documented above.  2334: Updated patient. Pain \"coming back.\" Amenable to admission.  2342: Discussed with marco a Donaldson. Accepts admission but requests GI consultation.   Discussed with Dr. Dozier, GI, who agrees with current plan. States ERCP can be performed Thursday (today).    [place] Score at 0 hours = 3   1 point for age >55 (67)   1 point for LDH >350 U/L (1416)   1 point for AST >250 U/L (1821)    Impression & Plan    Medical Decision Making:  Amy is a 67 year old woman who presents with epigastric pain worsening over the last 6 hours.  She has had nausea without vomiting.   She is well-appearing on exam, but she does have notable epigastric tenderness.  EKG is reassuring without acute ST changes or arrhythmias.  Unlikely to be cardiac in etiology given historical features of pain. Patient was given Pepcid, Zofran, morphine, and IV fluids during her workup.  Fortunately, the right upper quadrant ultrasound does not reveal evidence of cholecystitis.  Cholelithiasis is appreciated.  However, she has marked lab abnormalities including a lipase of 21,233 and transaminitis with ALT 1092 and AST 18.1.  Interestingly, her bilirubin is normal at 1.2 though direct bilirubin is elevated at 0.7.  I believe this is consistent with early obstructive pattern.  There is no leukocytosis and patient is not febrile.  She does not " warrant antibiotics.  Further, CRP is less than 2.9 making a systemic inflammatory or infectious pathology less likely.  Patient's electrolytes are reassuring and creatinine is 1.15 which is at her baseline.  LDH is elevated at 1416 making patient's Irais score at 0 hours 3 (see above).  Patient initially had good pain control with first dose of morphine but on reevaluation states her pain is starting to return and she will receive a second dose.  I discussed results of laboratory and imaging studies and presumed diagnosis of gallstone pancreatitis and need for admission with the patient and her family.  I answered all the patient's questions and she verbalized understanding.  Amenable to admission.  I discussed patient's case with Britney Bird MD, hospitalist, who accepts the patients admission and requests GI consultation.  I discussed patient's case with NALDO Ricardo, who states patient can be seen and have ERCP as needed in the morning.    Diagnosis:    ICD-10-CM    1. Gallstone pancreatitis K85.10 Lactate Dehydrogenase     Acetaminophen level     Acetaminophen level     CANCELED: Acetaminophen level   2. Transaminitis R74.0         6/13/2018   Lucie Sharif MD Dixson, Kylie S, MD  06/14/18 0304

## 2018-06-14 NOTE — H&P
History and Physical     Amy Haq MRN# [id number]   YOB: 1950 Age: 67 year old      Date of Admission:  6/13/2018    Primary care provider: Francisca Sainz          Assessment and Plan:   This patient is a 67 year old female with PMH of obesity s/p gastric bypass, prior hx of htn resolved after weight loss, gout, GERD, CKD with baseline creat 1.2-1.4 range of who presents with abdominal pain with LFTs showing profoundly elevated transaminases (AST 1800 ! ALT 1000 ), lipase 21 K, mildly elevated bili, and e/o small gall stones by US without e/o cholecystitis or biliary dilatation.     1.  Pancreatitis:  ? Due to stone, admit keep NPO/IVF, IV antiemetics and pain medications, formal GI consultation for probable need for ERCP-Discussed with ER who discussed with GI who stated they would be able to meet her needs here. Will check MRCP especially in light of her gastric bypass, ? If that would make ERCP more challenging. I've held off on surgical consultation for now.     2.  Hepatitis:  She had normal LFTs 5/16/18. Profound-her numbers don't fit a straight biliary obstructive picture.  Monitor, defer to GI re further w/u (Hep serologies, autoimmune w/u etc) no hx of hypotension to suggest shock liver.  apap undetectable,  and denies significant etoh intake, no trauma.  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  CKD:  At baseline to better    4.  Hyperglycemia on admission:  Likely stress response.  Recheck in am.  Actually reports that she gets hypoglycemic with prolonged NPO status, therefore will initiate q 4 hours bs checks with hypoglycemic protocol.  For now she's on NS, but if blood sugars start to run low would switch to D5    5.  Gout:  Cont allopurinol when med rec completed    6.  Hypothyroid:  Cont replacement med rec completed    7.  LDH elevation of unclear clinical significance or relevance to her presentation       PPX:  PCDs-as  will likely need procedure  Code:  Full  Dispo:  Admit IP for > than expected 2 [place] stay               Chief Complaint:   Abdominal pain       History of Present Illness:   This patient is a 67 year old female with PMH of obesity s/p gastric bypass, prior hx of htn resolved after weight loss, gout, GERD, CKD with baseline creat 1.2-1.4 range of who presents with abdominal pain.    It began 4p 6/13/18, epigastric and was so intense she was unable to take a deep breath.  She was periodically diaphoretic and had nausea but didn't vomit (2/2 prior gastric bypass), the intense pain lasted for about 90 minutes, then is subsided some but was still present, it radiates into her back.  She has felt chilled which is her normal and denies any fevers.  This is her 4 th episode like this over the past 6 weeks, she had a feeling it was her gallbladder.  Her prior episodes have never been this intense and have completely resolved over 60-90 minutes.     ER eval:  VS notable for hypertension, afeb, respiratory status in wnl. Labs remarkable for profound hepatitis with AST 1800/ALT 1000, minimal alk phos and no bili elevation, as well as lipase of 21 K.  AUS shows normal liver, a few small gallstones in the liver.    apap level is undetectable. She does not drink etoh, denies any abdominal trauma .      The history is obtained in discussion with the ER provider Dr Azar and the patient with good reliability        Past Medical History:     Past Medical History:   Diagnosis Date     Esophageal reflux      Gout      Hypertension     resolved after weight loss     Numbness and tingling     right leg tingling     Renal disease     renal insufficiency     Thyroid disease              Past Surgical History:     Past Surgical History:   Procedure Laterality Date     APPENDECTOMY       COLONOSCOPY       EXCISE LESION BACK N/A 12/3/2014    Procedure: EXCISE LESION BACK;  Surgeon: Elvin Patiño MD;  Location: [facility]     HEAD & NECK  SURGERY      Parathyriod tumor removal     HYSTERECTOMY, PAP NO LONGER INDICATED       LAPAROSCOPIC BYPASS GASTRIC N/A 12/3/2014    Procedure: LAPAROSCOPIC BYPASS GASTRIC;  Surgeon: Elvin Patiño MD;  Location:  OR     LAPAROSCOPIC LYSIS ADHESIONS N/A 12/3/2014    Procedure: LAPAROSCOPIC LYSIS ADHESIONS;  Surgeon: Elvin Patiño MD;  Location:  OR     PHACOEMULSIFICATION CLEAR CORNEA WITH STANDARD INTRAOCULAR LENS IMPLANT Right 5/29/2018    Procedure: PHACOEMULSIFICATION CLEAR CORNEA WITH STANDARD INTRAOCULAR LENS IMPLANT;  RIGHT EYE PHACOEMULSIFICATION CLEAR CORNEA WITH STANDARD INTRAOCULAR LENS IMPLANT ;  Surgeon: Tyree Mcintyre MD;  Location:  [facility]             Social History:     Social History   Substance Use Topics     Smoking status: Former Smoker     Packs/day: 0.50     Years: 35.00     Types: Cigarettes     Quit date: 9/26/2006     Smokeless tobacco: Never Used     Alcohol use Yes      Comment: occasionally   , independent in all ADLs, retired RN           Family History:     Family History   Problem Relation Age of Onset     CEREBROVASCULAR DISEASE Mother      born 1923 at age 76     Family History Negative Father      born 1923     Alzheimer Disease Father      Family History Negative Brother      Family History Negative Sister      Family History Negative Daughter             Allergies:     Allergies   Allergen Reactions     Lisinopril      Cough.             Medications:   Awaiting formal medication reconciliation             Review of Systems:   A Comprehensive greater than 10 system review of systems was carried out.  Pertinent positives and negatives are noted above.  Otherwise negative for contributory information.           Physical Exam:   Blood pressure (!) 183/97, pulse 79, temperature 98.1  F (36.7  C), temperature source Oral, resp. rate 18, SpO2 98 %, not currently breastfeeding.  Exam:    General:  Pleasant nad looks stated age  HEENT:  Head nc/at sclera  clear PERRL O/P: dry mucus membranes no posterior pharyngeal erythema or exudate.  Neck is supple  Lungs: cta b nl effort   CV:  RRR no m/r/g no le edema  Abd:  Soft, quite tender in epigastric region, minimal to no pain in ruq  Neuro:  Cn 2-12 grossly intact and strength is intact in b ue and le  Alert and oriented affect appropriate                Data:          Lab Results   Component Value Date     06/13/2018    Lab Results   Component Value Date    CHLORIDE 108 06/13/2018    Lab Results   Component Value Date    BUN 28 06/13/2018      Lab Results   Component Value Date    POTASSIUM 3.8 06/13/2018    Lab Results   Component Value Date    CO2 26 06/13/2018    Lab Results   Component Value Date    CR 1.15 06/13/2018        Lab Results   Component Value Date    WBC 10.7 06/13/2018    HGB 14.0 06/13/2018    HCT 43.7 06/13/2018     (H) 06/13/2018     06/13/2018     Lab Results   Component Value Date     (H) 06/13/2018     Lab Results   Component Value Date    AST 1821 (HH) 06/13/2018    ALT 1092 (HH) 06/13/2018    ALKPHOS 242 (H) 06/13/2018    BILITOTAL 1.2 06/13/2018            Imaging:     Recent Results (from the past 24 hour(s))   US Abdomen Limited    Narrative    ULTRASOUND ABDOMEN LIMITED RIGHT UPPER QUADRANT  6/13/2018 11:18 PM     HISTORY: Right upper quadrant pain.    COMPARISON: None.    FINDINGS: Normal hepatic echogenicity. No hepatic masses. A few small  gallstones are present within the gallbladder. No gallbladder wall  thickening, pericholecystic fluid or focal tenderness over the  gallbladder. No intra- or extrahepatic biliary dilatation. The right  kidney has normal size and echogenicity, measuring 10.8 cm in length.  Mild diffuse cortical thickening of the right kidney. No right  intrarenal collecting system dilatation, calculi or masses. No free  fluid in the upper right hemiabdomen.      Impression    IMPRESSION:  1. A few small gallstones in the gallbladder. No  evidence of acute  cholecystitis.  2. No biliary dilatation.  3. Unremarkable appearance of the liver.

## 2018-06-14 NOTE — PLAN OF CARE
Problem: Patient Care Overview  Goal: Plan of Care/Patient Progress Review  Outcome: Improving  NPO, IVF NS @100/hour, up indep in room, denies pain, voiding in good amts  MRCP cancelled per GI PA, ordered multiple labs and US of abd/pelvis to assess hepatic function which came back WDL then GI PA reordered MRCP

## 2018-06-14 NOTE — PHARMACY-ADMISSION MEDICATION HISTORY
Admission medication history interview status for this patient is complete. See [facility] admission navigator for allergy information, prior to admission medications and immunization status.     Medication history interview source(s):Patient  Medication history resources (including written lists, pill bottles, clinic record):[facility] list  Primary pharmacy:[facility]    Changes made to PTA medication list:  Added: ocuvite, pred forte eye drops, imprimis eye drops  Deleted: None  Changed: calcium supplement    Actions taken by pharmacist (provider contacted, etc):None     Additional medication history information: Pt had bypass surgery- doesn't take NSAIDS. Pt did take some tylenol a few days ago    Medication reconciliation/reorder completed by provider prior to medication history? No    Do you take OTC medications (eg tylenol, ibuprofen, fish oil, eye/ear drops, etc)? Y(Y/N)    For patients on insulin therapy: N (Y/N)    Prior to Admission medications    Medication Sig Last Dose Taking? Auth Provider   allopurinol (ZYLOPRIM) 100 MG tablet Take 2 tablets (200 mg) by mouth daily 6/13/2018 at Unknown time Yes Francisca Sainz APRN CNP   Calcium Carbonate-Vitamin D (CALCIUM + D PO) Take 1 tablet by mouth 2 times daily 600mg + vitamin D 6/13/2018 at Unknown time Yes Unknown, Entered By History   cetirizine (ZYRTEC) 10 MG tablet Take 10 mg by mouth daily. 6/13/2018 at Unknown time Yes Reported, Patient   cyanocobalamin (VITAMIN B12) 1000 MCG/ML injection inject 1 ml subcutaneous every 45 days Past Month at Unknown time Yes Francisca Sainz APRN CNP   levothyroxine (SYNTHROID/LEVOTHROID) 100 MCG tablet Take 1 tablet (100 mcg) by mouth daily 6/13/2018 at Unknown time Yes Francisca Sainz APRN CNP   Multiple Vitamins-Minerals (ICAPS AREDS FORMULA PO) Take 1 capsule by mouth daily 6/13/2018 at Unknown time Yes Reported, Patient   Multiple Vitamins-Minerals (OCUVITE PRESERVISION PO) Take 1 tablet by mouth 2 times daily  "6/13/2018 at Unknown time Yes Unknown, Entered By History   prednisolon-gatiflox-bromfenac, pt own, no charge, 1-0.5-0.075 % opthalmic solution Place 1 drop into the right eye 3 times daily Pt to stop after current bottle used up  Yes Unknown, Entered By History   prednisoLONE acetate (PRED FORTE) 1 % ophthalmic susp Place 1 drop into the right eye 3 times daily 6/13/2018 at Unknown time Yes Unknown, Entered By History   ranitidine (ZANTAC) 150 MG tablet Take 1 tablet (150 mg) by mouth 2 times daily 6/13/2018 at Unknown time Yes Francisca Sainz APRN CNP   VITAMIN D, CHOLECALCIFEROL, PO Take 2,000 Units by mouth daily as needed  Past Week at Unknown time Yes Reported, Patient   syringe/needle, disp, 25G X 1\" 3 ML MISC Use for B-12 injection   Francisca Sainz APRN CNP       \ed  "

## 2018-06-14 NOTE — PLAN OF CARE
Problem: Patient Care Overview  Goal: Plan of Care/Patient Progress Review  A/O, admired from er oriented to room. VSS pain 3/10 morphine for pain control.  Npo for gi consult today.

## 2018-06-15 ENCOUNTER — APPOINTMENT (OUTPATIENT)
Dept: GENERAL RADIOLOGY | Facility: CLINIC | Age: 68
DRG: 418 | End: 2018-06-15
Attending: SURGERY
Payer: MEDICARE

## 2018-06-15 ENCOUNTER — ANESTHESIA EVENT (OUTPATIENT)
Dept: SURGERY | Facility: CLINIC | Age: 68
DRG: 418 | End: 2018-06-15
Payer: MEDICARE

## 2018-06-15 ENCOUNTER — APPOINTMENT (OUTPATIENT)
Dept: SURGERY | Facility: PHYSICIAN GROUP | Age: 68
End: 2018-06-15
Payer: COMMERCIAL

## 2018-06-15 ENCOUNTER — SURGERY (OUTPATIENT)
Age: 68
End: 2018-06-15

## 2018-06-15 ENCOUNTER — ANESTHESIA (OUTPATIENT)
Dept: SURGERY | Facility: CLINIC | Age: 68
DRG: 418 | End: 2018-06-15
Payer: MEDICARE

## 2018-06-15 VITALS
DIASTOLIC BLOOD PRESSURE: 86 MMHG | SYSTOLIC BLOOD PRESSURE: 162 MMHG | OXYGEN SATURATION: 98 % | HEIGHT: 66 IN | WEIGHT: 210 LBS | BODY MASS INDEX: 33.75 KG/M2 | TEMPERATURE: 96.2 F | RESPIRATION RATE: 16 BRPM | HEART RATE: 66 BPM

## 2018-06-15 LAB
ALBUMIN SERPL-MCNC: 3 G/DL (ref 3.4–5)
ALP SERPL-CCNC: 193 U/L (ref 40–150)
ALT SERPL W P-5'-P-CCNC: 729 U/L (ref 0–50)
ANA SER QL IF: NEGATIVE
ANION GAP SERPL CALCULATED.3IONS-SCNC: 4 MMOL/L (ref 3–14)
AST SERPL W P-5'-P-CCNC: 369 U/L (ref 0–45)
BILIRUB SERPL-MCNC: 0.9 MG/DL (ref 0.2–1.3)
BUN SERPL-MCNC: 16 MG/DL (ref 7–30)
CALCIUM SERPL-MCNC: 8.3 MG/DL (ref 8.5–10.1)
CHLORIDE SERPL-SCNC: 114 MMOL/L (ref 94–109)
CO2 SERPL-SCNC: 25 MMOL/L (ref 20–32)
CREAT SERPL-MCNC: 1.12 MG/DL (ref 0.52–1.04)
GFR SERPL CREATININE-BSD FRML MDRD: 48 ML/MIN/1.7M2
GLUCOSE BLDC GLUCOMTR-MCNC: 83 MG/DL (ref 70–99)
GLUCOSE BLDC GLUCOMTR-MCNC: 85 MG/DL (ref 70–99)
GLUCOSE BLDC GLUCOMTR-MCNC: 87 MG/DL (ref 70–99)
GLUCOSE SERPL-MCNC: 83 MG/DL (ref 70–99)
INR PPP: 1.17 (ref 0.86–1.14)
LIPASE SERPL-CCNC: 431 U/L (ref 73–393)
POTASSIUM SERPL-SCNC: 3.9 MMOL/L (ref 3.4–5.3)
PROT SERPL-MCNC: 6.3 G/DL (ref 6.8–8.8)
SODIUM SERPL-SCNC: 143 MMOL/L (ref 133–144)

## 2018-06-15 PROCEDURE — 27210794 ZZH OR GENERAL SUPPLY STERILE: Performed by: SURGERY

## 2018-06-15 PROCEDURE — 27210995 ZZH RX 272: Performed by: SURGERY

## 2018-06-15 PROCEDURE — 25000128 H RX IP 250 OP 636: Performed by: SURGERY

## 2018-06-15 PROCEDURE — 88304 TISSUE EXAM BY PATHOLOGIST: CPT | Performed by: SURGERY

## 2018-06-15 PROCEDURE — 00000146 ZZHCL STATISTIC GLUCOSE BY METER IP

## 2018-06-15 PROCEDURE — 25000566 ZZH SEVOFLURANE, EA 15 MIN: Performed by: SURGERY

## 2018-06-15 PROCEDURE — 47563 LAPARO CHOLECYSTECTOMY/GRAPH: CPT | Mod: AS | Performed by: PHYSICIAN ASSISTANT

## 2018-06-15 PROCEDURE — 25000128 H RX IP 250 OP 636: Performed by: INTERNAL MEDICINE

## 2018-06-15 PROCEDURE — 36415 COLL VENOUS BLD VENIPUNCTURE: CPT | Performed by: PHYSICIAN ASSISTANT

## 2018-06-15 PROCEDURE — 25000128 H RX IP 250 OP 636: Performed by: ANESTHESIOLOGY

## 2018-06-15 PROCEDURE — 71000013 ZZH RECOVERY PHASE 1 LEVEL 1 EA ADDTL HR: Performed by: SURGERY

## 2018-06-15 PROCEDURE — 99238 HOSP IP/OBS DSCHRG MGMT 30/<: CPT | Performed by: INTERNAL MEDICINE

## 2018-06-15 PROCEDURE — 85610 PROTHROMBIN TIME: CPT | Performed by: PHYSICIAN ASSISTANT

## 2018-06-15 PROCEDURE — A9270 NON-COVERED ITEM OR SERVICE: HCPCS | Mod: GY | Performed by: INTERNAL MEDICINE

## 2018-06-15 PROCEDURE — 25000125 ZZHC RX 250: Performed by: SURGERY

## 2018-06-15 PROCEDURE — 25000128 H RX IP 250 OP 636: Performed by: NURSE ANESTHETIST, CERTIFIED REGISTERED

## 2018-06-15 PROCEDURE — 37000008 ZZH ANESTHESIA TECHNICAL FEE, 1ST 30 MIN: Performed by: SURGERY

## 2018-06-15 PROCEDURE — 25000132 ZZH RX MED GY IP 250 OP 250 PS 637: Mod: GY | Performed by: SURGERY

## 2018-06-15 PROCEDURE — 25000131 ZZH RX MED GY IP 250 OP 636 PS 637: Mod: GY | Performed by: NURSE ANESTHETIST, CERTIFIED REGISTERED

## 2018-06-15 PROCEDURE — 40000277 XR SURGERY CARM FLUORO LESS THAN 5 MIN W STILLS

## 2018-06-15 PROCEDURE — 86665 EPSTEIN-BARR CAPSID VCA: CPT | Performed by: PHYSICIAN ASSISTANT

## 2018-06-15 PROCEDURE — 71000012 ZZH RECOVERY PHASE 1 LEVEL 1 FIRST HR: Performed by: SURGERY

## 2018-06-15 PROCEDURE — 36000060 ZZH SURGERY LEVEL 3 W FLUORO 1ST 30 MIN: Performed by: SURGERY

## 2018-06-15 PROCEDURE — 36000058 ZZH SURGERY LEVEL 3 EA 15 ADDTL MIN: Performed by: SURGERY

## 2018-06-15 PROCEDURE — 25000125 ZZHC RX 250: Performed by: NURSE ANESTHETIST, CERTIFIED REGISTERED

## 2018-06-15 PROCEDURE — 25800025 ZZH RX 258: Performed by: SURGERY

## 2018-06-15 PROCEDURE — 37000009 ZZH ANESTHESIA TECHNICAL FEE, EACH ADDTL 15 MIN: Performed by: SURGERY

## 2018-06-15 PROCEDURE — A9270 NON-COVERED ITEM OR SERVICE: HCPCS | Mod: GY | Performed by: SURGERY

## 2018-06-15 PROCEDURE — 80053 COMPREHEN METABOLIC PANEL: CPT | Performed by: PHYSICIAN ASSISTANT

## 2018-06-15 PROCEDURE — 0FT44ZZ RESECTION OF GALLBLADDER, PERCUTANEOUS ENDOSCOPIC APPROACH: ICD-10-PCS | Performed by: SURGERY

## 2018-06-15 PROCEDURE — BF101ZZ FLUOROSCOPY OF BILE DUCTS USING LOW OSMOLAR CONTRAST: ICD-10-PCS | Performed by: SURGERY

## 2018-06-15 PROCEDURE — 25000132 ZZH RX MED GY IP 250 OP 250 PS 637: Mod: GY | Performed by: INTERNAL MEDICINE

## 2018-06-15 PROCEDURE — 83690 ASSAY OF LIPASE: CPT | Performed by: PHYSICIAN ASSISTANT

## 2018-06-15 PROCEDURE — 88304 TISSUE EXAM BY PATHOLOGIST: CPT | Mod: 26 | Performed by: SURGERY

## 2018-06-15 PROCEDURE — 99221 1ST HOSP IP/OBS SF/LOW 40: CPT | Mod: 57 | Performed by: SURGERY

## 2018-06-15 PROCEDURE — 40000306 ZZH STATISTIC PRE PROC ASSESS II: Performed by: SURGERY

## 2018-06-15 PROCEDURE — 25000131 ZZH RX MED GY IP 250 OP 636 PS 637: Mod: GY | Performed by: ANESTHESIOLOGY

## 2018-06-15 PROCEDURE — 71000027 ZZH RECOVERY PHASE 2 EACH 15 MINS: Performed by: SURGERY

## 2018-06-15 PROCEDURE — 47563 LAPARO CHOLECYSTECTOMY/GRAPH: CPT | Performed by: SURGERY

## 2018-06-15 RX ORDER — SODIUM CHLORIDE, SODIUM LACTATE, POTASSIUM CHLORIDE, CALCIUM CHLORIDE 600; 310; 30; 20 MG/100ML; MG/100ML; MG/100ML; MG/100ML
INJECTION, SOLUTION INTRAVENOUS CONTINUOUS
Status: DISCONTINUED | OUTPATIENT
Start: 2018-06-15 | End: 2018-06-15 | Stop reason: HOSPADM

## 2018-06-15 RX ORDER — GLYCOPYRROLATE 0.2 MG/ML
INJECTION, SOLUTION INTRAMUSCULAR; INTRAVENOUS PRN
Status: DISCONTINUED | OUTPATIENT
Start: 2018-06-15 | End: 2018-06-15

## 2018-06-15 RX ORDER — DEXAMETHASONE SODIUM PHOSPHATE 4 MG/ML
INJECTION, SOLUTION INTRA-ARTICULAR; INTRALESIONAL; INTRAMUSCULAR; INTRAVENOUS; SOFT TISSUE PRN
Status: DISCONTINUED | OUTPATIENT
Start: 2018-06-15 | End: 2018-06-15

## 2018-06-15 RX ORDER — ONDANSETRON 4 MG/1
4 TABLET, ORALLY DISINTEGRATING ORAL EVERY 30 MIN PRN
Status: DISCONTINUED | OUTPATIENT
Start: 2018-06-15 | End: 2018-06-15 | Stop reason: HOSPADM

## 2018-06-15 RX ORDER — LABETALOL HYDROCHLORIDE 5 MG/ML
10 INJECTION, SOLUTION INTRAVENOUS
Status: COMPLETED | OUTPATIENT
Start: 2018-06-15 | End: 2018-06-15

## 2018-06-15 RX ORDER — DIMENHYDRINATE 50 MG/ML
25 INJECTION, SOLUTION INTRAMUSCULAR; INTRAVENOUS
Status: DISCONTINUED | OUTPATIENT
Start: 2018-06-15 | End: 2018-06-15 | Stop reason: HOSPADM

## 2018-06-15 RX ORDER — DEXAMETHASONE SODIUM PHOSPHATE 4 MG/ML
4 INJECTION, SOLUTION INTRA-ARTICULAR; INTRALESIONAL; INTRAMUSCULAR; INTRAVENOUS; SOFT TISSUE EVERY 10 MIN PRN
Status: DISCONTINUED | OUTPATIENT
Start: 2018-06-15 | End: 2018-06-15 | Stop reason: HOSPADM

## 2018-06-15 RX ORDER — NEOSTIGMINE METHYLSULFATE 1 MG/ML
VIAL (ML) INJECTION PRN
Status: DISCONTINUED | OUTPATIENT
Start: 2018-06-15 | End: 2018-06-15

## 2018-06-15 RX ORDER — LABETALOL HYDROCHLORIDE 5 MG/ML
INJECTION, SOLUTION INTRAVENOUS PRN
Status: DISCONTINUED | OUTPATIENT
Start: 2018-06-15 | End: 2018-06-15

## 2018-06-15 RX ORDER — CEFAZOLIN SODIUM 2 G/100ML
2 INJECTION, SOLUTION INTRAVENOUS
Status: COMPLETED | OUTPATIENT
Start: 2018-06-15 | End: 2018-06-15

## 2018-06-15 RX ORDER — LIDOCAINE HYDROCHLORIDE 10 MG/ML
INJECTION, SOLUTION INFILTRATION; PERINEURAL PRN
Status: DISCONTINUED | OUTPATIENT
Start: 2018-06-15 | End: 2018-06-15

## 2018-06-15 RX ORDER — MEPERIDINE HYDROCHLORIDE 50 MG/ML
12.5 INJECTION INTRAMUSCULAR; INTRAVENOUS; SUBCUTANEOUS
Status: DISCONTINUED | OUTPATIENT
Start: 2018-06-15 | End: 2018-06-15 | Stop reason: HOSPADM

## 2018-06-15 RX ORDER — OXYCODONE HYDROCHLORIDE 5 MG/1
5-10 TABLET ORAL
Qty: 12 TABLET | Refills: 0 | Status: SHIPPED | OUTPATIENT
Start: 2018-06-15 | End: 2018-06-22

## 2018-06-15 RX ORDER — LIDOCAINE 40 MG/G
CREAM TOPICAL
Status: DISCONTINUED | OUTPATIENT
Start: 2018-06-15 | End: 2018-06-15 | Stop reason: HOSPADM

## 2018-06-15 RX ORDER — FENTANYL CITRATE 50 UG/ML
INJECTION, SOLUTION INTRAMUSCULAR; INTRAVENOUS PRN
Status: DISCONTINUED | OUTPATIENT
Start: 2018-06-15 | End: 2018-06-15

## 2018-06-15 RX ORDER — PROPOFOL 10 MG/ML
INJECTION, EMULSION INTRAVENOUS PRN
Status: DISCONTINUED | OUTPATIENT
Start: 2018-06-15 | End: 2018-06-15

## 2018-06-15 RX ORDER — CEFAZOLIN SODIUM 1 G/3ML
1 INJECTION, POWDER, FOR SOLUTION INTRAMUSCULAR; INTRAVENOUS SEE ADMIN INSTRUCTIONS
Status: DISCONTINUED | OUTPATIENT
Start: 2018-06-15 | End: 2018-06-15 | Stop reason: HOSPADM

## 2018-06-15 RX ORDER — ONDANSETRON 2 MG/ML
INJECTION INTRAMUSCULAR; INTRAVENOUS PRN
Status: DISCONTINUED | OUTPATIENT
Start: 2018-06-15 | End: 2018-06-15

## 2018-06-15 RX ORDER — FENTANYL CITRATE 50 UG/ML
25-50 INJECTION, SOLUTION INTRAMUSCULAR; INTRAVENOUS
Status: DISCONTINUED | OUTPATIENT
Start: 2018-06-15 | End: 2018-06-15 | Stop reason: HOSPADM

## 2018-06-15 RX ORDER — METOCLOPRAMIDE HYDROCHLORIDE 5 MG/ML
10 INJECTION INTRAMUSCULAR; INTRAVENOUS EVERY 6 HOURS PRN
Status: DISCONTINUED | OUTPATIENT
Start: 2018-06-15 | End: 2018-06-15 | Stop reason: HOSPADM

## 2018-06-15 RX ORDER — METOCLOPRAMIDE 10 MG/1
10 TABLET ORAL EVERY 6 HOURS PRN
Status: DISCONTINUED | OUTPATIENT
Start: 2018-06-15 | End: 2018-06-15 | Stop reason: HOSPADM

## 2018-06-15 RX ORDER — BUPIVACAINE HYDROCHLORIDE 5 MG/ML
INJECTION, SOLUTION EPIDURAL; INTRACAUDAL PRN
Status: DISCONTINUED | OUTPATIENT
Start: 2018-06-15 | End: 2018-06-15 | Stop reason: HOSPADM

## 2018-06-15 RX ORDER — HYDRALAZINE HYDROCHLORIDE 20 MG/ML
2.5-5 INJECTION INTRAMUSCULAR; INTRAVENOUS EVERY 10 MIN PRN
Status: DISCONTINUED | OUTPATIENT
Start: 2018-06-15 | End: 2018-06-15 | Stop reason: HOSPADM

## 2018-06-15 RX ORDER — HYDROMORPHONE HYDROCHLORIDE 1 MG/ML
.3-.5 INJECTION, SOLUTION INTRAMUSCULAR; INTRAVENOUS; SUBCUTANEOUS EVERY 10 MIN PRN
Status: DISCONTINUED | OUTPATIENT
Start: 2018-06-15 | End: 2018-06-15 | Stop reason: HOSPADM

## 2018-06-15 RX ORDER — OXYCODONE HYDROCHLORIDE 5 MG/1
5 TABLET ORAL
Status: COMPLETED | OUTPATIENT
Start: 2018-06-15 | End: 2018-06-15

## 2018-06-15 RX ORDER — ONDANSETRON 2 MG/ML
4 INJECTION INTRAMUSCULAR; INTRAVENOUS EVERY 30 MIN PRN
Status: DISCONTINUED | OUTPATIENT
Start: 2018-06-15 | End: 2018-06-15 | Stop reason: HOSPADM

## 2018-06-15 RX ORDER — NALOXONE HYDROCHLORIDE 0.4 MG/ML
.1-.4 INJECTION, SOLUTION INTRAMUSCULAR; INTRAVENOUS; SUBCUTANEOUS
Status: DISCONTINUED | OUTPATIENT
Start: 2018-06-15 | End: 2018-06-15 | Stop reason: HOSPADM

## 2018-06-15 RX ADMIN — PROPOFOL 150 MG: 10 INJECTION, EMULSION INTRAVENOUS at 14:44

## 2018-06-15 RX ADMIN — DEXAMETHASONE SODIUM PHOSPHATE 4 MG: 4 INJECTION, SOLUTION INTRA-ARTICULAR; INTRALESIONAL; INTRAMUSCULAR; INTRAVENOUS; SOFT TISSUE at 14:44

## 2018-06-15 RX ADMIN — LABETALOL HYDROCHLORIDE 5 MG: 5 INJECTION INTRAVENOUS at 15:05

## 2018-06-15 RX ADMIN — ALLOPURINOL 200 MG: 100 TABLET ORAL at 08:33

## 2018-06-15 RX ADMIN — PROCHLORPERAZINE EDISYLATE 5 MG: 5 INJECTION INTRAMUSCULAR; INTRAVENOUS at 16:20

## 2018-06-15 RX ADMIN — CEFAZOLIN SODIUM 2 G: 2 INJECTION, SOLUTION INTRAVENOUS at 14:41

## 2018-06-15 RX ADMIN — ONDANSETRON 4 MG: 2 INJECTION INTRAMUSCULAR; INTRAVENOUS at 15:20

## 2018-06-15 RX ADMIN — RANITIDINE 150 MG: 150 TABLET ORAL at 08:33

## 2018-06-15 RX ADMIN — GLYCOPYRROLATE 0.2 MG: 0.2 INJECTION, SOLUTION INTRAMUSCULAR; INTRAVENOUS at 14:44

## 2018-06-15 RX ADMIN — FENTANYL CITRATE 50 MCG: 50 INJECTION, SOLUTION INTRAMUSCULAR; INTRAVENOUS at 15:02

## 2018-06-15 RX ADMIN — ONDANSETRON 4 MG: 2 INJECTION, SOLUTION INTRAMUSCULAR; INTRAVENOUS at 16:05

## 2018-06-15 RX ADMIN — SODIUM CHLORIDE, POTASSIUM CHLORIDE, SODIUM LACTATE AND CALCIUM CHLORIDE: 600; 310; 30; 20 INJECTION, SOLUTION INTRAVENOUS at 15:21

## 2018-06-15 RX ADMIN — ROCURONIUM BROMIDE 40 MG: 10 INJECTION INTRAVENOUS at 14:44

## 2018-06-15 RX ADMIN — SODIUM CHLORIDE 5 ML GIVEN: 900 IRRIGANT IRRIGATION at 15:24

## 2018-06-15 RX ADMIN — GLYCOPYRROLATE 0.6 MG: 0.2 INJECTION, SOLUTION INTRAMUSCULAR; INTRAVENOUS at 15:29

## 2018-06-15 RX ADMIN — BUPIVACAINE HYDROCHLORIDE 30 ML: 5 INJECTION, SOLUTION EPIDURAL; INTRACAUDAL at 15:23

## 2018-06-15 RX ADMIN — LABETALOL HYDROCHLORIDE 5 MG: 5 INJECTION INTRAVENOUS at 14:58

## 2018-06-15 RX ADMIN — MIDAZOLAM 2 MG: 1 INJECTION INTRAMUSCULAR; INTRAVENOUS at 14:41

## 2018-06-15 RX ADMIN — SODIUM CHLORIDE: 9 INJECTION, SOLUTION INTRAVENOUS at 07:37

## 2018-06-15 RX ADMIN — FENTANYL CITRATE 50 MCG: 50 INJECTION INTRAMUSCULAR; INTRAVENOUS at 16:37

## 2018-06-15 RX ADMIN — FENTANYL CITRATE 50 MCG: 50 INJECTION, SOLUTION INTRAMUSCULAR; INTRAVENOUS at 14:54

## 2018-06-15 RX ADMIN — FENTANYL CITRATE 100 MCG: 50 INJECTION, SOLUTION INTRAMUSCULAR; INTRAVENOUS at 14:44

## 2018-06-15 RX ADMIN — FENTANYL CITRATE 50 MCG: 50 INJECTION, SOLUTION INTRAMUSCULAR; INTRAVENOUS at 14:57

## 2018-06-15 RX ADMIN — PREDNISOLONE ACETATE 1 DROP: 10 SUSPENSION/ DROPS OPHTHALMIC at 08:33

## 2018-06-15 RX ADMIN — SODIUM CHLORIDE 1000 ML: 900 IRRIGANT IRRIGATION at 15:24

## 2018-06-15 RX ADMIN — HYDRALAZINE HYDROCHLORIDE 5 MG: 20 INJECTION INTRAMUSCULAR; INTRAVENOUS at 16:02

## 2018-06-15 RX ADMIN — Medication 10 MG: at 16:12

## 2018-06-15 RX ADMIN — LIDOCAINE HYDROCHLORIDE 50 MG: 10 INJECTION, SOLUTION INFILTRATION; PERINEURAL at 14:44

## 2018-06-15 RX ADMIN — HYDRALAZINE HYDROCHLORIDE 2.5 MG: 20 INJECTION INTRAMUSCULAR; INTRAVENOUS at 15:54

## 2018-06-15 RX ADMIN — LEVOTHYROXINE SODIUM 100 MCG: 100 TABLET ORAL at 08:33

## 2018-06-15 RX ADMIN — SODIUM CHLORIDE: 9 INJECTION, SOLUTION INTRAVENOUS at 14:41

## 2018-06-15 RX ADMIN — Medication 4 MG: at 15:29

## 2018-06-15 RX ADMIN — FENTANYL CITRATE 25 MCG: 50 INJECTION INTRAMUSCULAR; INTRAVENOUS at 17:04

## 2018-06-15 RX ADMIN — OXYCODONE HYDROCHLORIDE 5 MG: 5 TABLET ORAL at 17:03

## 2018-06-15 NOTE — BRIEF OP NOTE
[facility] Brief Operative Note    Pre-operative diagnosis: Cholecystitis   Post-operative diagnosis Biliary pancreatitis   Procedure: Procedure(s):  LAPAROSCOPIC CHOLECYSTECTOMY WITH CHOLANGIOGRAMS  - Wound Class: II-Clean Contaminated   Surgeon(s): Surgeon(s) and Role:     * Deyvi Watkins MD - Primary     * Mary Salcido PA-C - Assisting   Estimated blood loss: 20 mL    Specimens:   ID Type Source Tests Collected by Time Destination   A :  Tissue Gallbladder and Contents SURGICAL PATHOLOGY EXAM Deyvi Watkins MD 6/15/2018  3:01 PM       Findings: Mild edema of the gallbladder, stones present, normal cholangiograms.

## 2018-06-15 NOTE — ANESTHESIA CARE TRANSFER NOTE
Patient: Amy Haq    Procedure(s):  LAPAROSCOPIC CHOLECYSTECTOMY WITH CHOLANGIOGRAMS  - Wound Class: II-Clean Contaminated    Diagnosis: Cholecystitis  Diagnosis Additional Information: No value filed.    Anesthesia Type:   General, ETT     Note:  Airway :Face Mask  Patient transferred to:PACU  Comments: To PACU, oxygen per face mask,report to RN.      Vitals: (Last set prior to Anesthesia Care Transfer)    CRNA VITALS  6/15/2018 1508 - 6/15/2018 1544      6/15/2018             Pulse: 95    SpO2: 100 %    Resp Rate (observed): 14                Electronically Signed By: MOUNA Andrade CRNA  Kaela 15, 2018  3:44 PM

## 2018-06-15 NOTE — OP NOTE
Procedure Date: 06/15/2018      PREOPERATIVE DIAGNOSIS:  Biliary pancreatitis.      POSTOPERATIVE DIAGNOSIS:  Biliary pancreatitis.      PROCEDURE:  Laparoscopic cholecystectomy with intraoperative cholangiogram.      ANESTHESIA:  General plus local.      SURGEON:  Deyvi Hayes MD      ASSISTANT:  Mary Salcido PA-C.  Physician assistant was medically necessary for skill in suturing, cutting of suture, exposure, traction, countertraction and camera management throughout the operation.      SPECIMENS:  Gallbladder and contents.      COMPLICATIONS:  None.      INDICATIONS:  Ms. Haq is a 67-year-old female with a history of a laparoscopic Yonis-en-Y gastric bypass who presented with several days of epigastric pain.  She had had several similar episodes in the past, but these were all self-limited and less severe.  She sought evaluation in the ER several days ago where she was found to have a lipase of over 28,000.  This gradually subsided as did her LFTs, this morning she was pain free and our service was consulted to discuss the matter of cholecystectomy with her.  As the patient wished to obviate further episodes of pancreatitis, she wished to proceed with cholecystectomy today.  Risks of procedure including infection, bleeding, harm to adjacent structures, open conversion, retained stone, bile leak, common duct injury, need for ERCP via access from her gastric remnant were reviewed.  The patient verbalized understanding of the above and consented to proceed.      FINDINGS:  There were omental adhesions to the gallbladder body, there was mild edema of the gallbladder wall as well.  There were stones palpable within the lumen of the gallbladder also.  The critical view of safety was obtained prior to clipping the cystic duct.  Intraoperative cholangiograms showed no filling defects or duct dilation.      DESCRIPTION OF PROCEDURE:  With the patient under excellent general anesthesia in supine position, the  abdomen was prepped and draped in the usual sterile surgical fashion.  Timeout was performed confirming the patient, procedure to be done as well as drug allergies and site markings.  The patient did receive a dose of Ancef for infection prophylaxis prior to making our incision.        We began by elevating the superior umbilical skin fold and incising it longitudinally with an 11 blade and dissecting bluntly down to the level of the midline fascia with Kocher clamps.  The fascia was grasped and elevated into view and incised sharply with a #15 blade.  Stay sutures were placed and the peritoneum was punctured bluntly with a Carmalt clamp.  We introduced a Paula port through this defect, applied pneumoperitoneum and placed the patient in reverse Trendelenburg position.  She was rolled slightly to her left as well.  Three further 5 mm ports were placed under direct vision in the epigastrium and right upper quadrant.  The gallbladder was visualized, grasped and elevated to view, there were some omental adhesions taken down with electrocautery to expose the infundibulum.  This was subsequently grasped and retracted laterally.  The serosa on the medial and lateral aspects of the infundibulum were incised with electrocautery and swept downwards.  We bluntly created a plane between the presumptive cystic duct and artery with a Kitner and enlarged it further with electrocautery.  The artery was clipped and divided to enlarge this window further.  A Merida clamp was then introduced in the field and clamped across the infundibulum.  The catheter cannulated the gallbladder adjacent to the duct and infundibulum junction.  A cholangiogram was performed without difficulty, there were no filling defects or duct dilation or other anatomic abnormalities.  The cystic duct was therefore clipped and divided and the Merida clamp and catheter were removed and replaced with a normal grasper.  The gallbladder was then dissected free from  the gallbladder fossa with electrocautery and placed in an Endocatch pouch.  Right lower quadrant was then copiously irrigated, suctioned dry and found to be satisfactorily hemostatic.  The upper ports were removed as was the Paula and pneumoperitoneum was released.  The gallbladder was delivered through the fascial defect in the pouch and passed off for routine pathology.  We did palpate several stones within the gallbladder lumen after removal.  The fascia was closed with 0 Vicryl stitches in a figure-of-eight fashion x 2, the stay sutures tied over them.  Thirty mL of 0.5% Marcaine were then distributed in all incisions.  Skin was closed with 4-0 Vicryls in deep interrupted fashion.  Skin glue was applied atop the wounds after closure.  The patient tolerated the procedure well and was extubated and brought to recovery in excellent condition.  All sharps and sponge counts correct at the conclusion of the case.         OZ LEWIS MD             D: 06/15/2018   T: 06/15/2018   MT: RODNEY      Name:     LANDON VANN   MRN:      [id number]        Account:        [id number]   :      1950           Procedure Date: 06/15/2018      Document: [id number]       cc: Francisca Sainz NP

## 2018-06-15 NOTE — ANESTHESIA PREPROCEDURE EVALUATION
Anesthesia Evaluation     . Pt has had prior anesthetic. Type: General           ROS/MED HX    ENT/Pulmonary:  - neg pulmonary ROS     Neurologic:  - neg neurologic ROS     Cardiovascular:     (+) hypertension----. : . . . :. .       METS/Exercise Tolerance:     Hematologic:  - neg hematologic  ROS       Musculoskeletal:  - neg musculoskeletal ROS       GI/Hepatic: Comment: Yonis-en-Y gastric bypass     (+) GERD Asymptomatic on medication, cholecystitis/cholelithiasis,       Renal/Genitourinary:     (+) chronic renal disease,       Endo:     (+) thyroid problem hypothyroidism, Obesity, .      Psychiatric:  - neg psychiatric ROS       Infectious Disease:  - neg infectious disease ROS       Malignancy:      - no malignancy   Other:    (+) No chance of pregnancy C-spine cleared: N/A, no H/O Chronic Pain,no other significant disability                    Physical Exam  Normal systems: cardiovascular, pulmonary and dental    Airway   Mallampati: II  TM distance: >3 FB  Neck ROM: full    Dental     Cardiovascular       Pulmonary                     Anesthesia Plan      History & Physical Review  History and physical reviewed and following examination; no interval change.    ASA Status:  3 .    NPO Status:  > 8 hours    Plan for General and ETT with Intravenous induction. Maintenance will be Balanced.    PONV prophylaxis:  Ondansetron (or other 5HT-3) and Dexamethasone or Solumedrol       Postoperative Care      Consents  Anesthetic plan, risks, benefits and alternatives discussed with:  Patient.  Use of blood products discussed: Yes.   Use of blood products discussed with Patient.  Consented to blood products.  .                          .

## 2018-06-15 NOTE — PROGRESS NOTES
GASTROENTEROLOGY PROGRESS NOTE       SUBJECTIVE:  Feeling much better. No abdominal pain. Had a bowel movement. Going to try clear liquids.     OBJECTIVE:    /70  Pulse 79  Temp 97  F (36.1  C)  Resp 16  Wt 95.3 kg (210 lb)  SpO2 97%  BMI 34.15 kg/m2  Temp (24hrs), Av.5  F (36.4  C), Min:97  F (36.1  C), Max:98  F (36.7  C)    Patient Vitals for the past 72 hrs:   Weight   18 0100 95.3 kg (210 lb)       Intake/Output Summary (Last 24 hours) at 06/15/18 1040  Last data filed at 18 2121   Gross per 24 hour   Intake             1609 ml   Output                0 ml   Net             1609 ml        PHYSICAL EXAM    Constitutional: no acute distress  Cardiovascular: RRR, normal S1, S2, no murmur appreciated   Respiratory: good transmission, CTAB  Abdomen: soft, non-tender, +bowel sounds  NEURO: CN 2-12 grossly intact, no focal deficits  SKIN: No jaundice         Additional Comments:  ROS, FH, SH: See initial GI consult for details.     I have reviewed the patient's new clinical lab results:     Recent Labs   Lab Test  06/15/18   0601  06/14/18   0619  06/13/18   2205  05/16/18   0948   14   0814   WBC   --   6.3  10.7  6.3   < >   --    HGB   --   12.9  14.0  13.0   < >   --    MCV   --   100  101*  99   < >   --    PLT   --   194  225  222   < >   --    INR  1.17*   --    --    --    --   1.01    < > = values in this interval not displayed.     Recent Labs   Lab Test  06/15/18   0601  06/14/18   0619  06/13/18   2205   POTASSIUM  3.9  4.3  3.8   CHLORIDE  114*  114*  108   CO2  25  25  26   BUN  16  21  28   ANIONGAP  4  6  6     Recent Labs   Lab Test  06/15/18   0601  06/14/18   0619  06/13/18   2205   10/19/17   1026   ALBUMIN  3.0*  3.1*  3.8   < >   --    BILITOTAL  0.9  0.7  1.2   < >   --    ALT  729*  1248*  1092*   < >   --    AST  369*  1308*  1821*   < >   --    PROTEIN   --    --    --    --   30*   LIPASE  431*  1190*  61655*   --    --     < > = values in this interval not  displayed.     Preliminary Result       Exam Information      Exam Date Exam Time Accession # Performing Department Results      6/14/18  4:19 PM [id number] [facility] MRI        PACS Images      Show images for MR Abdomen MRCP w/o & w Contrast       Study Result      MR ABDOMEN MRCP WITHOUT AND WITH CONTRAST  6/14/2018 4:19 PM      HISTORY:  Elevated liver function testing. Gallstones.     TECHNIQUE: Multisequence, multiplanar imaging of the abdomen was  performed without IV gadolinium contrast. A total of 9 mL Gadavist  gadolinium contrast was then administered intravenously. Additional  dynamic postcontrast T1 fat-sat sequences were performed. MRCP imaging  was also performed.     COMPARISON: Abdominal ultrasound performed 6/14/2018 and 6/13/2018.     FINDINGS: No evidence for biliary or pancreatic ductal dilatation. No  biliary filling defects to suggest choledocholithiasis. Multiple small  gallstones are noted within the gallbladder. No evidence for fatty  infiltration of the liver. 0.6 cm cyst in the posterior segment of the  right hepatic lobe superiorly. Few small bilateral renal cortical  cysts are noted, with the largest in the interpolar region on the left  measuring 1 cm. Several tiny hypoenhancing foci in both kidneys may  also represent cysts but are too small to characterize definitively.  There is a small, somewhat elongated, simple-appearing cyst in the  pancreatic head, measuring 2 x 1 cm (series 22 image 43). No solid  nodular components or enhancing septations are noted within this  pancreatic cystic lesion. The liver, gallbladder, spleen, adrenal  glands, pancreas, and kidneys are otherwise unremarkable. No  hydronephrosis. Small hiatal hernia. Visualized loops of small bowel  and colon are of normal caliber. No enlarged lymph nodes are  identified in the abdomen. Degenerative changes are noted in the  visualized thoracolumbar spine. Lumbar curve, convex left.         IMPRESSION:    1. There is a 2 x 1 cm simple-appearing cyst in the pancreatic head.  Recommend followup pancreatic MRI in one year to ensure stability.  2. Cholelithiasis.          ASSESSMENT/ PLAN  Amy Haq is a 67 year old admitted with epigastric pain and found to have elevated LFTs and lipase. She had an US with normal liver, gall stones but no ductal dilation. AST/ALT in 1000's normal T bili. Acetaminophen level negative. No recent alcohol use. Medications reviewed. US with doppler with patent vessels. Preliminary read from MRCP with cholelithiasis but no choledocholithiasis. 2x1 cm simple-appearing cyst in pancreas. Other liver labs have included negative acute hepatitis panel, CMV IgG negative. SHERRILL and EBV pending. LFTs much improved this AM. At this point, it seems most likely that she passed a gallstone which caused the hepatitis and pancreatitis.    -Trial of clear liquids  -Recommend surgical consult  -Recommend repeating LFTs in 1-2 weeks with PCP. She can be seen back in our clinic if ongoing elevation or symptoms, but I am hopeful that she will continue to improve.  -Await final MRCP read but based on above, she should have repeat pancreatic MRI in one year  -EBV and SHERRILL pending       Sherry Lazaro PA-C  [facility]

## 2018-06-15 NOTE — CONSULTS
Consult Date:  06/15/2018      DATE OF SERVICE: 06/15/2018.      REASON FOR CONSULTATION:  Biliary pancreatitis.      HISTORY OF PRESENT ILLNESS:  Ms. Haq is a 67-year-old female with history of laparoscopic Yonis-en-Y gastric bypass who presented to the ER with several days of epigastric pain.  She has had several similar episodes to this in the past but nothing so severe.  Nothing that persisted more than an hour or 2 previously.  She did not have any jaundice or icterus at the time.  She was noted to have a significant elevation in her lipase associated with gallstones.  The patient states that she never uses alcohol.  Her lipase has since normalized as have her LFTs.  MRCP did not show a common duct stone.  I was asked to discuss timing and indications for cholecystectomy in this context.      PAST MEDICAL AND SURGICAL HISTORY:  This includes hypothyroidism, obesity, gout, reflux, prior gastric bypass performed at [facility] in 12/2014 with subsequent 70 pound weight loss, hyperlipidemia, hypertension, prior surgery for a parathyroid adenoma.      CURRENT MEDICATIONS:  At the time of admission, the patient was on allopurinol, calcium and vitamin D, Zyrtec, vitamin B12, levothyroxine, multivitamins, Zantac.      ALLERGIES:  THE PATIENT HAS A DRUG ALLERGY TO LISINOPRIL WHICH CAUSES A COUGH.       SOCIAL HISTORY:  The patient is .  She is here with her .  She does not consume alcohol.      PHYSICAL EXAMINATION:   VITAL SIGNS:  Ms. Haq is afebrile, temperature this morning is 97 degrees, pulse 67 with a blood pressure 148/70, respiration rate 16 with 97% saturations on room air.   GENERAL:  The patient is generally alert, appropriate, nontoxic, sitting up in a chair.   HEENT:  The patient has no jaundice or icterus.   HEART:  Sounds are regular without murmurs.   LUNGS:  Breath sounds are clear, without wheezes or crackles.   ABDOMEN:  Benign.  She has no epigastric or right upper quadrant  tenderness and no Valdez's sign on deep inspiration.      LABORATORY EXAMS:  Notable for a lipase on 06/13, of 21,000, it subsequently dropped to 3600 and 430 today.  Her bilirubin has been normal since admission.  Alkaline phosphatase is still elevated at 193, transaminases were in the low thousand, currently in the high 100s.  INR is  close to normal.      IMAGING:  I reviewed the patient's ultrasound from the time of admission, this showed a few stones in the gallbladder with no acute evidence of cholecystitis, she has no duct dilation evident on this study either.  MRCP did not show retained stone and also did not show any signs of cholecystitis.      IMPRESSION AND RECOMMENDATIONS:  This is a 67-year-old female with cholelithiasis and recent bout of pancreatitis, likely due to a biliary etiology given that she does not consume alcohol ever.  She is feeling better currently and chemically her pancreatitis is resolved as well.  I discussed the pathophysiology of biliary colic and indications for surgery.  I stated that there is a 50/50 chance that she would have recurrent bouts of pancreatitis without surgery in the coming 3 months or so based on literature data.  I gave her the option of discharging to home with close followup in the outpatient surgery versus proceeding with surgery today to obviate further attacks.  The patient is interested in pursuing an operation today.  Risks of procedure including infection, bleeding, harm to adjacent structures, open conversion, retained stone, bile leak, common duct injury and chronic diarrhea were reviewed, as was the potential discharge to home if her recovery is uneventful.  We also discussed the need for special surgical care if she requires an ERCP because of her altered anatomy secondary to her bypass.  All questions were answered to the best of my ability as well.      Thank you very much for this consultation.         OZ LEWIS MD             D: 06/15/2018    T: 06/15/2018   MT: RODNEY      Name:     LANDON VANN   MRN:      [id number]        Account:       [id number]   :      1950           Consult Date:  06/15/2018      Document: [id number]       cc: Francisca Sainz NP

## 2018-06-15 NOTE — PLAN OF CARE
Problem: Patient Care Overview  Goal: Plan of Care/Patient Progress Review  Outcome: Improving  A&Ox4. VSS. RA. CMS intact. Up independently in room. Ambulated in hallway, tolerated well. MRCP done this evening, results pending. IVF infusing. BS active, passing flatus, last BM 6/14 (before admission). NPO except ice chips and meds. Denies pain. Will continue to monitor.

## 2018-06-15 NOTE — PROGRESS NOTES
[facility]  Hospitalist Progress Note  Name: Amy Haq    MRN: [id number]  YOB: 1950    Age: 67 year old  Date of admission: 6/13/2018  Primary care provider: Francisca Sainz      Reason for Stay (Diagnosis): Acute pancreatitis/transaminitis         Assessment and Plan:      Summary of Stay:  This patient is a 67 year old female with PMH of obesity s/p gastric bypass, prior hx of htn resolved after weight loss, gout, GERD, CKD with baseline creat 1.2-1.4 range of who presents with abdominal pain with LFTs showing profoundly elevated transaminases (AST 1800 ! ALT 1000 ), lipase 21 K, mildly elevated bili, and e/o small gall stones by US without e/o cholecystitis or biliary dilatation.     Problem List/Plan:  1. Acute pancreatitis/transaminitis: GI input greatly appreciated.  Duplex ultrasound was negative for vascular occlusion.  Only cholelithiasis.  MRCP only demonstrates a simple pancreatic cyst but no choledocholithiasis.  Clinically, no evidence of cholecystitis.  Lipase along with LFTs are downtrending nicely.  Hindsight, this could very well be biliary in nature. Discussed with GI; would request general surgery consultation to discuss cholecystectomy.  Viral hepatitides also negative.  Will start patient on clear liquid diet and advance as tolerated to full liquids.  2. Chronic kidney disease: Baseline creatinine 1.1-1.2.  Currently at baseline.  3. Hyperglycemia: Secondary to problem #1.  4. History of gout: Continue allopurinol  5. Hypothyroidism: Continue Synthroid      DVT Prophylaxis: Low Risk/Ambulatory with no VTE prophylaxis indicated  Code Status: Full Code  Discharge Dispo: Home  Estimated Disch Date / # of Days until Disch: In 1-2 days depending on surgery's recommendation        Interval History (Subjective):      Had a bowel movement yesterday.  Positive flatus and reports that abdominal pain is controlled.         Physical Exam:      Vital  "signs:  Temp: 97  F (36.1  C) Temp src: Oral BP: 148/70   Heart Rate: 64 Resp: 16 SpO2: 97 % O2 Device: None (Room air)     Weight: 95.3 kg (210 lb)  Estimated body mass index is 34.15 kg/(m^2) as calculated from the following:    Height as of 5/22/18: 1.67 m (5' 5.75\").    Weight as of this encounter: 95.3 kg (210 lb).    # Pain Assessment:  Current Pain Score 6/15/2018   Patient currently in pain? no   Pain score (0-10) -   Pain location -   Pain descriptors -   Amy gonzales pain level was assessed and she currently denies pain.        I/O last 3 completed shifts:  In: 1609 [P.O.:60; I.V.:1549]  Out: -   Vitals:    06/14/18 0100   Weight: 95.3 kg (210 lb)       Constitutional: Awake, alert, cooperative, no apparent distress           Abdomen: Normal bowel sounds, soft, non-distended, non-tender   Skin: No rashes, no cyanosis, dry to touch   Neuro: CN 2-12 intact, no localizing weakness   Extremities: No edema, normal range of motion   HEENT Normocephalic, atraumatic, normal nasal turbinates; oropharynx clear   Neck Supple; nl inspection; trachea midline; no thryomegaly   Psychiatric: A+O x3. Normal affect          Medications:      All current medications were reviewed with changes reflected in problem list.         Data:      All new lab and imaging data was reviewed.   Labs:    Recent Labs  Lab 06/14/18  0619 06/13/18  2205   WBC 6.3 10.7   HGB 12.9 14.0   HCT 40.1 43.7    101*    225       Recent Labs  Lab 06/15/18  0601 06/14/18  0619 06/13/18  2205    145* 140   POTASSIUM 3.9 4.3 3.8   CHLORIDE 114* 114* 108   CO2 25 25 26   ANIONGAP 4 6 6   GLC 83 89 152*   BUN 16 21 28   CR 1.12* 1.11* 1.15*   GFRESTIMATED 48* 49* 47*   GFRESTBLACK 59* 59* 57*   JENNY 8.3* 8.2* 9.0   PROTTOTAL 6.3* 6.6* 7.6   ALBUMIN 3.0* 3.1* 3.8   BILITOTAL 0.9 0.7 1.2   ALKPHOS 193* 226* 242*   * 1308* 1821*   * 1248* 1092*      Imaging:   Recent Results (from the past 24 hour(s))   US Abddomen Limited w " Abd/Pelvis Duplex Complete    Narrative    RIGHT UPPER QUADRANT ULTRASOUND 6/14/2018 11:14 AM    HISTORY:  Transaminitis evaluate liver and assess for vascular  occlusion (eg. Budd-Chiari syndrome);     COMPARISON: None.      Impression    IMPRESSION :    Liver is 16.7 cm in length and normal echogenicity. The main, right  and left portal veins and left, middle and right hepatic veins are  patent with normal direction flow. No evidence for hepatic vascular  occlusion. No focal liver lesions.    ERIK DIAZ MD   MR Abdomen MRCP w/o & w Contrast    Narrative    MR ABDOMEN MRCP WITHOUT AND WITH CONTRAST  6/14/2018 4:19 PM     HISTORY:  Elevated liver function testing. Gallstones.    TECHNIQUE: Multisequence, multiplanar imaging of the abdomen was  performed without IV gadolinium contrast. A total of 9 mL Gadavist  gadolinium contrast was then administered intravenously. Additional  dynamic postcontrast T1 fat-sat sequences were performed. MRCP imaging  was also performed.    COMPARISON: Abdominal ultrasound performed 6/14/2018 and 6/13/2018.    FINDINGS: No evidence for biliary or pancreatic ductal dilatation. No  biliary filling defects to suggest choledocholithiasis. Multiple small  gallstones are noted within the gallbladder. No evidence for fatty  infiltration of the liver. 0.6 cm cyst in the posterior segment of the  right hepatic lobe superiorly. Few small bilateral renal cortical  cysts are noted, with the largest in the interpolar region on the left  measuring 1 cm. Several tiny hypoenhancing foci in both kidneys may  also represent cysts but are too small to characterize definitively.  There is a small, somewhat elongated, simple-appearing cyst in the  pancreatic head, measuring 2 x 1 cm (series 22 image 43). No solid  nodular components or enhancing septations are noted within this  pancreatic cystic lesion. The liver, gallbladder, spleen, adrenal  glands, pancreas, and kidneys are otherwise unremarkable.  No  hydronephrosis. Small hiatal hernia. Visualized loops of small bowel  and colon are of normal caliber. No enlarged lymph nodes are  identified in the abdomen. Degenerative changes are noted in the  visualized thoracolumbar spine. Lumbar curve, convex left.      Impression    IMPRESSION:   1. There is a 2 x 1 cm simple-appearing cyst in the pancreatic head.  Recommend followup pancreatic MRI in one year to ensure stability.  2. Cholelithiasis.       Melissa Macdonald -[phone]

## 2018-06-15 NOTE — PROVIDER NOTIFICATION
Web based paged: Hi, please see [facility] results. Pt. would like to know if she can eat.

## 2018-06-15 NOTE — ANESTHESIA POSTPROCEDURE EVALUATION
Patient: Amy Haq    Procedure(s):  LAPAROSCOPIC CHOLECYSTECTOMY WITH CHOLANGIOGRAMS  - Wound Class: II-Clean Contaminated    Diagnosis:Cholecystitis  Diagnosis Additional Information: Biliary pancreatitis    Anesthesia Type:  General, ETT    Note:  Anesthesia Post Evaluation    Patient location during evaluation: PACU  Patient participation: Able to fully participate in evaluation  Level of consciousness: awake and alert  Pain management: adequate  Airway patency: patent  Cardiovascular status: acceptable  Respiratory status: acceptable  Hydration status: acceptable  PONV: controlled     Anesthetic complications: None          Last vitals:  Vitals:    06/15/18 1555 06/15/18 1600 06/15/18 1615   BP: (!) 186/93 (!) 185/92 183/89   Pulse:      Resp: 10 15 16   Temp:  97  F (36.1  C)    SpO2: 100% 100% 100%         Electronically Signed By: Perry Cooper MD  Kaela 15, 2018  4:21 PM

## 2018-06-15 NOTE — DISCHARGE INSTRUCTIONS
GENERAL ANESTHESIA OR SEDATION ADULT DISCHARGE INSTRUCTIONS   SPECIAL PRECAUTIONS FOR 24 HOURS AFTER SURGERY    IT IS NOT UNUSUAL TO FEEL LIGHT-HEADED OR FAINT, UP TO 24 HOURS AFTER SURGERY OR WHILE TAKING PAIN MEDICATION.  IF YOU HAVE THESE SYMPTOMS; SIT FOR A FEW MINUTES BEFORE STANDING AND HAVE SOMEONE ASSIST YOU WHEN YOU GET UP TO WALK OR USE THE BATHROOM.    YOU SHOULD REST AND RELAX FOR THE NEXT 24 HOURS AND YOU MUST MAKE ARRANGEMENTS TO HAVE SOMEONE STAY WITH YOU FOR AT LEAST 24 HOURS AFTER YOUR DISCHARGE.  AVOID HAZARDOUS AND STRENUOUS ACTIVITIES.  DO NOT MAKE IMPORTANT DECISIONS FOR 24 HOURS.    DO NOT DRIVE ANY VEHICLE OR OPERATE MECHANICAL EQUIPMENT FOR 24 HOURS FOLLOWING THE END OF YOUR SURGERY.  EVEN THOUGH YOU MAY FEEL NORMAL, YOUR REACTIONS MAY BE AFFECTED BY THE MEDICATION YOU HAVE RECEIVED.    DO NOT DRINK ALCOHOLIC BEVERAGES FOR 24 HOURS FOLLOWING YOUR SURGERY.    DRINK CLEAR LIQUIDS (APPLE JUICE, GINGER ALE, 7-UP, BROTH, ETC.).  PROGRESS TO YOUR REGULAR DIET AS YOU FEEL ABLE.    YOU MAY HAVE A DRY MOUTH, A SORE THROAT, MUSCLES ACHES OR TROUBLE SLEEPING.  THESE SHOULD GO AWAY AFTER 24 HOURS.    CALL YOUR DOCTOR FOR ANY OF THE FOLLOWING:  SIGNS OF INFECTION (FEVER, GROWING TENDERNESS AT THE SURGERY SITE, A LARGE AMOUNT OF DRAINAGE OR BLEEDING, SEVERE PAIN, FOUL-SMELLING DRAINAGE, REDNESS OR SWELLING.    IT HAS BEEN OVER 8 TO 10 HOURS SINCE SURGERY AND YOU ARE STILL NOT ABLE TO URINATE (PASS WATER).     HOME CARE FOLLOWING LAPAROSCOPIC CHOLECYSTECTOMY  BRITTA Vera, GINA Malik, BRITTA Koch. ALEXUS Robison      INCISIONAL CARE:  Replace the bandage over your incisions until all drainage stops, or if more comfortable to have in place.  If present, leave the steri-strips (white paper tapes) in place until they fall off.  If Dermabond (a type of skin glue) is present, leave in place until it wears/flakes off.     BATHING:  Avoid baths for 1 week after surgery.   Showers are okay.  You may wash your hair at any time.  Gently pat your incisions dry after bathing.    ACTIVITY:  Light Activity -- you may immediately be up and about as tolerated.  Driving -- you may drive when comfortable and off narcotic pain medications.  Light Work -- resume when comfortable off pain medications.  (If you can drive, you probably can work.)  Strenuous Work/Activity -- limit lifting to 20 pounds for 1 week.  Progressively increase with time.  Active Sports (running, biking, etc.) -- cautiously resume after 2 weeks.    DISCOMFORT:  Use pain medications as prescribed by your surgeon.  Take the pain medication with some food, when possible, to minimize side effects.  Intermittent use of ice packs at the incision sites may help during the first 48 hours.  Expect gradual improvement.  You may experience shoulder pain, which is due to the air placed within your abdomen during the procedure.  This is temporary and usually passes within 2 days.    DIET:  Drink plenty of fluids.  While taking pain medications, increase dietary fiber or add a fiber supplementation like Metamucil or Citrucel to help prevent constipation - a possible side effect of pain medications.  If taking Metamucil or Citrucel, take with plenty of fluids as instructed.  It is not uncommon to experience some bowel changes (loose stools or constipation) after surgery.  Your body has to adapt to you no longer having a gall bladder.  To help minimize this side effect, avoid fatty foods for the first week after surgery.  You may then slowly increase the amount of fatty foods in your diet.      NAUSEA:  If nauseated from the anesthetic/pain meds; rest in bed, get up cautiously with assistance, and drink clear liquids (juice, tea, broth).    RETURN APPOINTMENT:  Schedule a follow-up visit 1-3 weeks post-op (you may do this any time after surgery is scheduled).  Office Phone:  [phone]    CONTACT US IF THE FOLLOWING DEVELOPS:  1.  A fever  that is above 101    2.  If there is a large amount of drainage, bleeding, or swelling.  3.  Severe pain that is not relieved by your prescription.  4.  Drainage that is thick, cloudy, yellow, green or white.  5.  Any other questions not answered by  Frequently Asked Questions  sheet.     FREQUENTLY ASKED QUESTIONS AFTER SURGERY  Aayush Zheng, BRITTA Newell, GINA Malik, JASMIN Rivas, BRITTA Peña, ALEXUS Hayes  &  ZION Robison      Q:  How should my incision look?    A:  Normally your incision will appear slightly swollen with light redness directly along the incision itself as it heals.  It may feel like a bump or ridge as the healing/scarring happens, and over time (3-4 months) this bump or ridge feeling should slowly go away.  In general, clear or pink watery drainage can be normal at first as your incision heals, but should decrease over time.    Q:  How do I know if my incision is infected?  A:  Look at your incision for signs of infection, like redness around the incision spreading to surrounding skin, or drainage of cloudy or foul-smelling drainage.  If you feel warm, check your temperature to see if you are running a fever.    **If any of these things occur, please notify the nurse at our office.  We may need you to come into the office for an incision check.      Q:  How do I take care of my incision?  A:  If you have a dressing in place - Starting the day after surgery, replace the dressing 1-2 times a day until there is no further drainage from the incision.  At that time, a dressing is no longer needed.  Try to minimize tape on the skin if irritation is occurring at the tape sites.  If you have significant irritation from tape on the skin, please call the office to discuss other method of dressing your incision.    Small pieces of tape called  steri-strips  may be present directly overlying your incision; these may be removed 10 days after surgery unless otherwise specified by your surgeon.  If these tapes  start to loosen at the ends, you may trim them back until they fall off or are removed.    A:  If you had  Dermabond  tissue glue used as a dressing (this causes your incision to look shiny with a clear covering over it) - This type of dressing wears off with time and does not require more dressings over the top unless it is draining around the glue as it wears off.  Do not apply ointments or lotions over the incisions until the glue has completely worn off.    Q:  There is a piece of tape or a sticky  lead  still on my skin.  Can I remove this?  A:  Sometimes the sticky  leads  used for monitoring during surgery or for evaluation in the emergency department are not all removed while you are in the hospital.  These sometimes have a tab or metal dot on them.  You can easily remove these on your own, like taking off a band-aid.  If there is a gel substance under the  lead , simply wipe/clean it off with a washcloth or paper towel.      Q:  What can I do to minimize constipation (very hard stools, or lack of stools)?  A:  Stay well hydrated.  Increase your dietary fiber intake or take a fiber supplement -with plenty of water.  Walk around frequently.  You may consider an over-the-counter stool-softener.  Your Pharmacist can assist you with choosing one that is stocked at your pharmacy.  Constipation is also one of the most common side effects of pain medication.  If you are using pain medication, be pro-active and try to PREVENT problems with constipation by taking the steps above BEFORE constipation becomes a problem.    Q:  What do I do if I need more pain medications?  A:  Call the office to receive refills.  Be aware that certain pain meds cannot be called into a pharmacy and actually require a paper prescription.  A change may be made in your pain med as you progress thru your recovery period or if you have side effects to certain meds.    --Pain meds are NOT refilled after 5pm on weekdays, and NOT AT ALL on the  weekends, so please look ahead to prevent problems.                  Q:  Why am I having a hard time sleeping now that I am at home?  A:  Many medications you receive while you are in the hospital can impact your sleep for a number of days after your surgery/hospitalization.  Decreased level of activity and naps during the day may also make sleeping at night difficult.  Try to minimize day-time naps, and get up frequently during the day to walk around your home during your recovery time.  Sleep aides may be of some help, but are not recommended for long-term use.      Q:  I am having some back discomfort.  What should I do?  A:  This may be related to certain positioning that was required for your surgery, extended periods of time in bed, or other changes in your overall activity level.  You may try ice, heat, acetaminophen, or ibuprofen to treat this temporarily.  Note that many pain medications have acetaminophen in them and would state this on the prescription bottle.  Be sure not to exceed the maximum of 4000mg per day of acetaminophen.     **If the pain you are having does not resolve, is severe, or is a flare of back pain you have had on other occasions prior to surgery, please contact your primary physician for further recommendations or for an appointment to be examined at their office.    Q:  Why am I having headaches?  A:  Headaches can be caused by many things:  caffeine withdrawal, use of pain meds, dehydration, high blood pressure, lack of sleep, over-activity/exhaustion, flare-up of usual migraine headaches.  If you feel this is related to muscle tension (a band-like feeling around the head, or a pressure at the low-back of the head) you may try ice or heat to this area.  You may need to drink more fluids (try electrolyte drink like Gatorade), rest, or take your usual migraine medications.   **If your headaches do not resolve, worsen, are accompanied by other symptoms, or if your blood pressure is  high, please call your primary physician for recommendation and/or examination.    Q:  I am unable to urinate.  What do I do?  A:  A small percentage of people can have difficulty urinating initially after surgery.  This includes being able to urinate only a very small amount at a time and feeling discomfort or pressure in the very low abdomen.  This is called  urinary retention , and is actually an urgent situation.  Proceed to your nearest Emergency department for evaluation (not an Urgent Care Center).  Sometimes the bladder does not work correctly after certain medications you receive during surgery, or related to certain procedures.  You may need to have a catheter placed until your bladder recovers.  When planning to go to an Emergency department, it may help to call the ER to let them know you are coming in for this problem after a surgery.  This may help you get in quicker to be evaluated.  **If you have symptoms of a urinary tract infection, please contact your primary physician for the proper evaluation and treatment.      If you have other questions, please call the office Monday thru Friday between 8am and 5pm to discuss with the nurse or physician assistant.  #[phone]    There is a surgeon ON CALL on weekday evenings and over the weekend in case of urgent need only, and may be contacted at the same number.    If you are having an emergency, call 911 or proceed to your nearest emergency department.    You received a pain pill (Oxycodone 5 mg) at 5:00 pm

## 2018-06-16 LAB — EBV VCA IGG SER QL IA: <0.2 AI (ref 0–0.8)

## 2018-06-16 NOTE — DISCHARGE SUMMARY
[facility]  Discharge Summary        Amy Haq MRN# [id number]   YOB: 1950 Age: 67 year old     Date of Admission:  6/13/2018  Date of Discharge:  6/15/2018  5:55 PM  Admitting Physician:  Britney Bird MD  Discharge Physician: Melissa Macdonald MD  Discharging Service: Hospitalist     Primary Provider: Francisca Milian  Primary Care Physician Phone Number: [phone]         Discharge Diagnoses/Problem Oriented Hospital Course (Providers):    Amy Haq was admitted on 6/13/2018 by Britney Bird MD and I would refer you to their history and physical.      Patient was seen and examined on the day of discharge    Discharge diagnoses:  1. Acute pancreatitis, presumed biliary in nature  2. Chronic kidney disease with baseline creatinine of 1.1-1.2  3. Hyperglycemia secondary to #1 gout  4. Hypothyroidism    Hospital course:  Patient is a 67 year old female with PMH of obesity s/p gastric bypass, prior hx of htn resolved after weight loss, gout, GERD, CKD with baseline creat 1.2-1.4 range of who presents with abdominal pain with LFTs showing profoundly elevated transaminases (AST 1800 ! ALT 1000 ), lipase 21 K, mildly elevated bili, and e/o small gall stones by US without e/o cholecystitis or biliary dilatation.  Patient was admitted and made n.p.o.  GI was consulted.  Given the degree of transaminitis, workup for hepatocellular disease including a hepatitis viral panel which was negative for CMV, hepatitis A, B, and C.  Additionally, duplex ultrasound of the liver was negative for any vascular occlusion. MRCP was negative for choledocholithiasis. In retrospect, it was felt that the pancreatitis was biliary in nature.  In any event, surgery was consulted and brought patient to the OR on 6/15/15 for a laparoscopic cholecystectomy.  Postoperative course was unremarkable.  Intraoperative cholangiogram was also negative for choledocholithiasis.  Communication was made with  general surgery who felt that the patient can be discharged from PACU.    Disposition: Discharged home in stable condition.  Primary MD follow-up within 1 week.  Routine postoperative surgical follow-up.             Pending Results:        Unresulted Labs Ordered in the Past 30 Days of this Admission     Date and Time Order Name Status Description    6/15/2018 1509 Surgical pathology exam In process     6/15/2018 0000 EBV Capsid Antibody IgG In process             Discharge Instructions and Follow-Up:      Follow-up Appointments     Follow-up and recommended labs and tests        Follow up with primary care provider, Francisca Sainz, within 7 days   for hospital follow- up.  The following labs/tests are recommended:   cmp/lipase.                      Discharge Disposition:      Discharged to home         Discharge Medications:        Discharge Medication List as of 6/15/2018  5:09 PM      START taking these medications    Details   oxyCODONE IR (ROXICODONE) 5 MG tablet Take 1-2 tablets (5-10 mg) by mouth every 3 hours as needed for pain or other (Moderate to Severe), Disp-12 tablet, R-0, Local Print         CONTINUE these medications which have NOT CHANGED    Details   allopurinol (ZYLOPRIM) 100 MG tablet Take 2 tablets (200 mg) by mouth daily, Disp-180 tablet, R-3, E-Prescribe      Calcium Carbonate-Vitamin D (CALCIUM + D PO) Take 1 tablet by mouth 2 times daily 600mg + vitamin D, Historical      cetirizine (ZYRTEC) 10 MG tablet Take 10 mg by mouth daily., Historical      cyanocobalamin (VITAMIN B12) 1000 MCG/ML injection inject 1 ml subcutaneous every 45 days, Disp-2 mL, R-3, E-Prescribe      levothyroxine (SYNTHROID/LEVOTHROID) 100 MCG tablet Take 1 tablet (100 mcg) by mouth daily, Disp-90 tablet, R-3, E-Prescribe      !! Multiple Vitamins-Minerals (ICAPS AREDS FORMULA PO) Take 1 capsule by mouth daily, Historical      !! Multiple Vitamins-Minerals (OCUVITE PRESERVISION PO) Take 1 tablet by mouth 2 times  "daily, Historical      prednisolon-gatiflox-bromfenac, pt own, no charge, 1-0.5-0.075 % opthalmic solution Place 1 drop into the right eye 3 times daily Pt to stop after current bottle used up, Historical      prednisoLONE acetate (PRED FORTE) 1 % ophthalmic susp Place 1 drop into the right eye 3 times daily, Historical      ranitidine (ZANTAC) 150 MG tablet Take 1 tablet (150 mg) by mouth 2 times daily, Disp-180 tablet, R-3, E-Prescribe      VITAMIN D, CHOLECALCIFEROL, PO Take 2,000 Units by mouth daily as needed , Historical      syringe/needle, disp, 25G X 1\" 3 ML MISC Use for B-12 injection, Disp-3 each, R-3, E-Prescribe       !! - Potential duplicate medications found. Please discuss with provider.            Allergies:         Allergies   Allergen Reactions     Lisinopril      Cough.           Consultations This Hospital Stay:      Consultation during this admission received from gastroenterology and surgery           Image Results From This Hospital Stay (For Non-EPIC Providers):        Results for orders placed or performed during the hospital encounter of 06/13/18   US Abdomen Limited    Narrative    ULTRASOUND ABDOMEN LIMITED RIGHT UPPER QUADRANT  6/13/2018 11:18 PM     HISTORY: Right upper quadrant pain.    COMPARISON: None.    FINDINGS: Normal hepatic echogenicity. No hepatic masses. A few small  gallstones are present within the gallbladder. No gallbladder wall  thickening, pericholecystic fluid or focal tenderness over the  gallbladder. No intra- or extrahepatic biliary dilatation. The right  kidney has normal size and echogenicity, measuring 10.8 cm in length.  Mild diffuse cortical thinning of the right kidney. No right  intrarenal collecting system dilatation, calculi or masses. No free  fluid in the upper right hemiabdomen.      Impression    IMPRESSION:  1. A few small gallstones in the gallbladder. No evidence of acute  cholecystitis.  2. No biliary dilatation.  3. Unremarkable appearance of the " liver.    IRVIN MAR MD   US Abddomen Limited w Abd/Pelvis Duplex Complete    Narrative    RIGHT UPPER QUADRANT ULTRASOUND 6/14/2018 11:14 AM    HISTORY:  Transaminitis evaluate liver and assess for vascular  occlusion (eg. Budd-Chiari syndrome);     COMPARISON: None.      Impression    IMPRESSION :    Liver is 16.7 cm in length and normal echogenicity. The main, right  and left portal veins and left, middle and right hepatic veins are  patent with normal direction flow. No evidence for hepatic vascular  occlusion. No focal liver lesions.    ERIK DIAZ MD   MR Abdomen MRCP w/o & w Contrast    Narrative    MR ABDOMEN MRCP WITHOUT AND WITH CONTRAST  6/14/2018 4:19 PM     HISTORY:  Elevated liver function testing. Gallstones.    TECHNIQUE: Multisequence, multiplanar imaging of the abdomen was  performed without IV gadolinium contrast. A total of 9 mL Gadavist  gadolinium contrast was then administered intravenously. Additional  dynamic postcontrast T1 fat-sat sequences were performed. MRCP imaging  was also performed.    COMPARISON: Abdominal ultrasound performed 6/14/2018 and 6/13/2018.    FINDINGS: No evidence for biliary or pancreatic ductal dilatation. No  biliary filling defects to suggest choledocholithiasis. Multiple small  gallstones are noted within the gallbladder. No evidence for fatty  infiltration of the liver. 0.6 cm cyst in the posterior segment of the  right hepatic lobe superiorly. Few small bilateral renal cortical  cysts are noted, with the largest in the interpolar region on the left  measuring 1 cm. Several tiny hypoenhancing foci in both kidneys may  also represent cysts but are too small to characterize definitively.  There is a small, somewhat elongated, simple-appearing cyst in the  pancreatic head, measuring 2 x 1 cm (series 22 image 43). No solid  nodular components or enhancing septations are noted within this  pancreatic cystic lesion. The liver, gallbladder, spleen,  adrenal  glands, pancreas, and kidneys are otherwise unremarkable. No  hydronephrosis. Small hiatal hernia. Visualized loops of small bowel  and colon are of normal caliber. No enlarged lymph nodes are  identified in the abdomen. Degenerative changes are noted in the  visualized thoracolumbar spine. Lumbar curve, convex left.      Impression    IMPRESSION:   1. There is a 2 x 1 cm simple-appearing cyst in the pancreatic head.  Recommend followup pancreatic MRI in one year to ensure stability.  2. Cholelithiasis.    CLOTILDE MORRISON MD

## 2018-06-18 ENCOUNTER — TELEPHONE (OUTPATIENT)
Dept: INTERNAL MEDICINE | Facility: CLINIC | Age: 68
End: 2018-06-18

## 2018-06-18 LAB — COPATH REPORT: NORMAL

## 2018-06-18 NOTE — TELEPHONE ENCOUNTER
IP F/U    Date: 06/15/18  Diagnosis: Gallstone Pancreatitis  Is patient active in care coordination? No  Was patient in TCU? No

## 2018-06-22 ENCOUNTER — OFFICE VISIT (OUTPATIENT)
Dept: INTERNAL MEDICINE | Facility: CLINIC | Age: 68
End: 2018-06-22
Payer: COMMERCIAL

## 2018-06-22 VITALS
DIASTOLIC BLOOD PRESSURE: 76 MMHG | OXYGEN SATURATION: 96 % | WEIGHT: 206 LBS | HEIGHT: 66 IN | SYSTOLIC BLOOD PRESSURE: 128 MMHG | TEMPERATURE: 98.6 F | BODY MASS INDEX: 33.11 KG/M2 | HEART RATE: 79 BPM

## 2018-06-22 DIAGNOSIS — K85.10 ACUTE BILIARY PANCREATITIS, UNSPECIFIED COMPLICATION STATUS: ICD-10-CM

## 2018-06-22 DIAGNOSIS — Z09 HOSPITAL DISCHARGE FOLLOW-UP: Primary | ICD-10-CM

## 2018-06-22 LAB
ALBUMIN SERPL-MCNC: 3.4 G/DL (ref 3.4–5)
ALP SERPL-CCNC: 149 U/L (ref 40–150)
ALT SERPL W P-5'-P-CCNC: 94 U/L (ref 0–50)
ANION GAP SERPL CALCULATED.3IONS-SCNC: 6 MMOL/L (ref 3–14)
AST SERPL W P-5'-P-CCNC: 20 U/L (ref 0–45)
BILIRUB SERPL-MCNC: 0.6 MG/DL (ref 0.2–1.3)
BUN SERPL-MCNC: 22 MG/DL (ref 7–30)
CALCIUM SERPL-MCNC: 9.4 MG/DL (ref 8.5–10.1)
CHLORIDE SERPL-SCNC: 109 MMOL/L (ref 94–109)
CO2 SERPL-SCNC: 28 MMOL/L (ref 20–32)
CREAT SERPL-MCNC: 1.32 MG/DL (ref 0.52–1.04)
GFR SERPL CREATININE-BSD FRML MDRD: 40 ML/MIN/1.7M2
GLUCOSE SERPL-MCNC: 89 MG/DL (ref 70–99)
LIPASE SERPL-CCNC: 314 U/L (ref 73–393)
POTASSIUM SERPL-SCNC: 4.1 MMOL/L (ref 3.4–5.3)
PROT SERPL-MCNC: 7.2 G/DL (ref 6.8–8.8)
SODIUM SERPL-SCNC: 143 MMOL/L (ref 133–144)

## 2018-06-22 PROCEDURE — 36415 COLL VENOUS BLD VENIPUNCTURE: CPT | Performed by: PHYSICIAN ASSISTANT

## 2018-06-22 PROCEDURE — 83690 ASSAY OF LIPASE: CPT | Performed by: PHYSICIAN ASSISTANT

## 2018-06-22 PROCEDURE — 80053 COMPREHEN METABOLIC PANEL: CPT | Performed by: PHYSICIAN ASSISTANT

## 2018-06-22 PROCEDURE — 99495 TRANSJ CARE MGMT MOD F2F 14D: CPT | Performed by: PHYSICIAN ASSISTANT

## 2018-06-22 NOTE — NURSING NOTE
"Chief Complaint   Patient presents with     Hospital F/U     initial /76  Pulse 79  Temp 98.6  F (37  C) (Oral)  Ht 5' 6\" (1.676 m)  Wt 206 lb (93.4 kg)  SpO2 96%  BMI 33.25 kg/m2 Estimated body mass index is 33.25 kg/(m^2) as calculated from the following:    Height as of this encounter: 5' 6\" (1.676 m).    Weight as of this encounter: 206 lb (93.4 kg)..  bp completed using cuff size large  LISA OLIVERA LPN  "

## 2018-06-22 NOTE — MR AVS SNAPSHOT
After Visit Summary   6/22/2018    Amy Haq    MRN: [id number]           Patient Information     Date Of Birth          1950        Visit Information        Provider Department      6/22/2018 9:00 AM Lois Diamond PA-C [facility]        Today's Diagnoses     Hospital discharge follow-up    -  1    Acute biliary pancreatitis, unspecified complication status          Care Instructions    I will recheck lab work today and contact you with results. Follow up with surgery as scheduled.           Follow-ups after your visit        Follow-up notes from your care team     Return if symptoms worsen or fail to improve.      Your next 10 appointments already scheduled     Jun 29, 2018  9:00 AM CDT   Post Op with Mary Salcido PA-C   [facility] ([facility]    [facility] [address]  [facility] [id number] [phone]              Who to contact     If you have questions or need follow up information about today's clinic visit or your schedule please contact [facility] directly at [phone].  Normal or non-critical lab and imaging results will be communicated to you by [employer], letter or phone within 4 business days after the clinic has received the results. If you do not hear from us within 7 days, please contact the clinic through [employer] or phone. If you have a critical or abnormal lab result, we will notify you by phone as soon as possible.  Submit refill requests through [employer] or call your pharmacy and they will forward the refill request to us. Please allow 3 business days for your refill to be completed.          Additional Information About Your Visit        MyChart Information     [employer] gives you secure access to your electronic health record. If you see a primary care provider, you can also send messages to your care team and make appointments. If you have questions, please call  "your primary care clinic.  If you do not have a primary care provider, please call [phone] and they will assist you.        Care EveryWhere ID     This is your Care EveryWhere ID. This could be used by other organizations to access your [facility] medical records  [id number]        Your Vitals Were     Pulse Temperature Height Pulse Oximetry BMI (Body Mass Index)       79 98.6  F (37  C) (Oral) 5' 6\" (1.676 m) 96% 33.25 kg/m2        Blood Pressure from Last 3 Encounters:   06/22/18 128/76   06/15/18 162/86   05/29/18 129/63    Weight from Last 3 Encounters:   06/22/18 206 lb (93.4 kg)   06/15/18 210 lb (95.3 kg)   05/22/18 209 lb 1.6 oz (94.8 kg)              We Performed the Following     Comprehensive metabolic panel     Lipase        Primary Care Provider Office Phone # Fax #    MOUNA [facility] [phone]       [address] [facility] [address]        Equal Access to Services     : Hadii aad ku hadasho Soomaali, waaxda luqadaha, qaybta kaalmada adeegyada, iris fraire . So [facility] [phone].    ATENCIÓN: Si habla español, tiene a luis disposición servicios gratuitos de asistencia lingüística. Llame al [phone].    We comply with applicable federal civil rights laws and [place] laws. We do not discriminate on the basis of race, color, national origin, age, disability, sex, sexual orientation, or gender identity.            Thank you!     Thank you for choosing [facility]  for your care. Our goal is always to provide you with excellent care. Hearing back from our patients is one way we can continue to improve our services. Please take a few minutes to complete the written survey that you may receive in the mail after your visit with us. Thank you!             Your Updated Medication List - Protect others around you: Learn how to safely use, store and throw away your medicines at www.disposemymeds.org.          This " "list is accurate as of 6/22/18  9:10 AM.  Always use your most recent med list.                   Brand Name Dispense Instructions for use Diagnosis    allopurinol 100 MG tablet    ZYLOPRIM    180 tablet    Take 2 tablets (200 mg) by mouth daily    Gout without tophus       CALCIUM + D PO      Take 1 tablet by mouth 2 times daily 600mg + vitamin D        cetirizine 10 MG tablet    zyrTEC     Take 10 mg by mouth daily.        cyanocobalamin 1000 MCG/ML injection    VITAMIN B12    2 mL    inject 1 ml subcutaneous every 45 days    Postoperative malabsorption       levothyroxine 100 MCG tablet    SYNTHROID/LEVOTHROID    90 tablet    Take 1 tablet (100 mcg) by mouth daily    Acquired hypothyroidism       OCUVITE PRESERVISION PO      Take 1 tablet by mouth 2 times daily        oxyCODONE IR 5 MG tablet    ROXICODONE    12 tablet    Take 1-2 tablets (5-10 mg) by mouth every 3 hours as needed for pain or other (Moderate to Severe)    Gallstone pancreatitis       prednisoLONE acetate 1 % ophthalmic susp    PRED FORTE     Place 1 drop into the right eye 3 times daily        ranitidine 150 MG tablet    ZANTAC    180 tablet    Take 1 tablet (150 mg) by mouth 2 times daily    Bariatric surgery status, Gastroesophageal reflux disease without esophagitis       syringe/needle (disp) 25G X 1\" 3 ML Misc     3 each    Use for B-12 injection    Bariatric surgery status       VITAMIN D (CHOLECALCIFEROL) PO      Take 2,000 Units by mouth daily as needed    Bariatric surgery status, Obesity, unspecified         "

## 2018-06-22 NOTE — PROGRESS NOTES
.  SUBJECTIVE:   Amy Haq is a 67 year old female who presents to clinic today for the following health issues:    Hospital Follow-up Visit:    Hospital/Nursing Home/IP Rehab Facility: [facility]  Date of Admission: 6-13-18  Date of Discharge: 6-15-18  Reason(s) for Admission: gallstone pancreatitis            Problems taking medications regularly:  None       Medication changes since discharge: None       Problems adhering to non-medication therapy:  None    Summary of hospitalization:  [facility] discharge summary reviewed  Diagnostic Tests/Treatments reviewed.  Follow up needed: with surgery  Other Healthcare Providers Involved in Patient s Care:         Surgical follow-up appointment - in 1 week  Update since discharge: improved.     Post Discharge Medication Reconciliation: discharge medications reconciled, continue medications without change.  Plan of care communicated with patient            Patient is here in clinic for follow up on recent hospitalization. She was seen for acute abdominal pain and discovered to have pancreatitis related to gallbladder. She had a lap cholecystectomy and was discharged in stable condition. She reports initially that she had some constipation but that has resolved and she has not needed her pain medication. She is not having fevers, chills or continued abdominal pain. She reports resolution of her overall symptoms. Surgical scars are itchy but healing well with no redness, drainage or evidence of infection.     -------------------------------------    Problem list and histories reviewed & adjusted, as indicated.  Additional history: as documented    BP Readings from Last 3 Encounters:   06/22/18 128/76   06/15/18 162/86   05/29/18 129/63    Wt Readings from Last 3 Encounters:   06/22/18 206 lb (93.4 kg)   06/15/18 210 lb (95.3 kg)   05/22/18 209 lb 1.6 oz (94.8 kg)        Reviewed and updated as needed this visit by clinical staff  Tobacco  Med Hx  " Surg Hx  Fam Hx  Soc Hx      Reviewed and updated as needed this visit by Provider       ROS:  Constitutional, HEENT, cardiovascular, pulmonary, gi and gu systems are negative, except as otherwise noted.    OBJECTIVE:     /76  Pulse 79  Temp 98.6  F (37  C) (Oral)  Ht 5' 6\" (1.676 m)  Wt 206 lb (93.4 kg)  SpO2 96%  BMI 33.25 kg/m2  Body mass index is 33.25 kg/(m^2).  GENERAL: healthy, alert and no distress  NECK: no adenopathy, no asymmetry, masses, or scars and thyroid normal to palpation  RESP: lungs clear to auscultation - no rales, rhonchi or wheezes  CV: regular rates and rhythm and no murmur, click or rub  ABDOMEN: soft, nontender and bowel sounds normal  MS: no gross musculoskeletal defects noted, no edema  SKIN: surgical sites healing well, mild bruising but no redness, swelling or discharge noted.     Diagnostic Test Results:  CMP and Lipase pending    ASSESSMENT/PLAN:       ICD-10-CM    1. Hospital discharge follow-up Z09 Comprehensive metabolic panel     Lipase   2. Acute biliary pancreatitis, unspecified complication status K85.10 Comprehensive metabolic panel     Lipase       Patient is doing well since hospitalization, I will recheck lab values and she will follow up as needed in clinic. She will follow up with surgery as scheduled next week.   See Patient Instructions    Lois Diamond PA-C  [facility]    "

## 2018-06-29 ENCOUNTER — OFFICE VISIT (OUTPATIENT)
Dept: SURGERY | Facility: CLINIC | Age: 68
End: 2018-06-29
Payer: COMMERCIAL

## 2018-06-29 VITALS
DIASTOLIC BLOOD PRESSURE: 80 MMHG | OXYGEN SATURATION: 96 % | WEIGHT: 206 LBS | HEART RATE: 81 BPM | HEIGHT: 66 IN | RESPIRATION RATE: 16 BRPM | BODY MASS INDEX: 33.11 KG/M2 | SYSTOLIC BLOOD PRESSURE: 110 MMHG

## 2018-06-29 DIAGNOSIS — Z09 SURGERY FOLLOW-UP: Primary | ICD-10-CM

## 2018-06-29 PROCEDURE — 99024 POSTOP FOLLOW-UP VISIT: CPT | Performed by: PHYSICIAN ASSISTANT

## 2018-06-29 NOTE — MR AVS SNAPSHOT
"              After Visit Summary   6/29/2018    Amy Haq    MRN: [id number]           Patient Information     Date Of Birth          1950        Visit Information        Provider Department      6/29/2018 9:00 AM Mary Salcido PA-C [facility]      Today's Diagnoses     Surgery follow-up    -  1       Follow-ups after your visit        Follow-up notes from your care team     Return if symptoms worsen or fail to improve.      Who to contact     If you have questions or need follow up information about today's clinic visit or your schedule please contact [facility] directly at [phone].  Normal or non-critical lab and imaging results will be communicated to you by [employer]hart, letter or phone within 4 business days after the clinic has received the results. If you do not hear from us within 7 days, please contact the clinic through [employer] or phone. If you have a critical or abnormal lab result, we will notify you by phone as soon as possible.  Submit refill requests through [employer] or call your pharmacy and they will forward the refill request to us. Please allow 3 business days for your refill to be completed.          Additional Information About Your Visit        MyChart Information     [employer] gives you secure access to your electronic health record. If you see a primary care provider, you can also send messages to your care team and make appointments. If you have questions, please call your primary care clinic.  If you do not have a primary care provider, please call [phone] and they will assist you.        Care EveryWhere ID     This is your Care EveryWhere ID. This could be used by other organizations to access your [facility] medical records  [id number]        Your Vitals Were     Pulse Respirations Height Pulse Oximetry BMI (Body Mass Index)       81 16 5' 6\" (1.676 m) 96% 33.25 kg/m2        Blood " Pressure from Last 3 Encounters:   06/29/18 110/80   06/22/18 128/76   06/15/18 162/86    Weight from Last 3 Encounters:   06/29/18 206 lb (93.4 kg)   06/22/18 206 lb (93.4 kg)   06/15/18 210 lb (95.3 kg)              Today, you had the following     No orders found for display       Primary Care Provider Office Phone # Fax #    Francisca Fish Alistair, APRN [facility] [phone]       [address] [facility] [address]        Equal Access to Services     [facility]: Hadii aad ku hadasho Soomaali, waaxda luqadaha, qaybta kaalmada ademeloyagilda, iris fraire . So [facility] [phone].    ATENCIÓN: Si habla español, tiene a luis disposición servicios gratuitos de asistencia lingüística. [facility] [phone].    We comply with applicable federal civil rights laws and [place] laws. We do not discriminate on the basis of race, color, national origin, age, disability, sex, sexual orientation, or gender identity.            Thank you!     Thank you for choosing [facility]  for your care. Our goal is always to provide you with excellent care. Hearing back from our patients is one way we can continue to improve our services. Please take a few minutes to complete the written survey that you may receive in the mail after your visit with us. Thank you!             Your Updated Medication List - Protect others around you: Learn how to safely use, store and throw away your medicines at www.disposemymeds.org.          This list is accurate as of 6/29/18  9:28 AM.  Always use your most recent med list.                   Brand Name Dispense Instructions for use Diagnosis    allopurinol 100 MG tablet    ZYLOPRIM    180 tablet    Take 2 tablets (200 mg) by mouth daily    Gout without tophus       CALCIUM + D PO      Take 1 tablet by mouth 2 times daily 600mg + vitamin D        cetirizine 10 MG tablet    zyrTEC     Take 10 mg by mouth daily.        cyanocobalamin 1000 MCG/ML  "injection    VITAMIN B12    2 mL    inject 1 ml subcutaneous every 45 days    Postoperative malabsorption       levothyroxine 100 MCG tablet    SYNTHROID/LEVOTHROID    90 tablet    Take 1 tablet (100 mcg) by mouth daily    Acquired hypothyroidism       OCUVITE PRESERVISION PO      Take 1 tablet by mouth 2 times daily        prednisoLONE acetate 1 % ophthalmic susp    PRED FORTE     Place 1 drop into the right eye 3 times daily        ranitidine 150 MG tablet    ZANTAC    180 tablet    Take 1 tablet (150 mg) by mouth 2 times daily    Bariatric surgery status, Gastroesophageal reflux disease without esophagitis       syringe/needle (disp) 25G X 1\" 3 ML Misc     3 each    Use for B-12 injection    Bariatric surgery status       VITAMIN D (CHOLECALCIFEROL) PO      Take 2,000 Units by mouth daily as needed    Bariatric surgery status, Obesity, unspecified         "

## 2018-06-29 NOTE — LETTER
2018    Re: Amy Haq - 1950    Amy Haq is here for her first postoperative visit.  She underwent Laparoscopic Cholecystectomy with negative cholangiogram by Dr. Hayes on Kaela/15 after admission for pancreatitis.  Pathology revealed: chronic cholecystitis and stones.  She has already seen her PCP for postop check and labs; lipase now WNL, LFT much improved, renal function stable.       She is now 2 weeks postop.  Today she is feeling well, eating well, having normal bowel movements and denies incision concerns.  Her recent medications include: none.  She has no concerns about her recovery today.       Objective:  Abd - soft, non-tender, non-distended  Incs - skin glue has peeled off, healing well, no erythema/bruising, +normal healing ridges/density, no seroma/hematoma noted, no hernia noted     Assessment:  S/p Laparoscopic Cholecystectomy; unremarkable recovery.     Plan:  Amy may continue to slowly advance her activities at this time.  General recommendation is to remain at a 30 lb weight restriction until 3 weeks after surgery.  After that time, she may increase activity as tolerated.  She may continue to utilize OTC pain management options as well as use of ice/heat to sites for comfort.  She should expect progressive resolution of the healing ridge along the incisional sites, normalizing bowel function, and improvement of food intolerances over the following 2-3 months.     Pt is recommended to contact the office if worsening pain, onset of fever/redness at any inc site, or new drainage from the area.  Pt also recommended to call office at any time if ongoing questions/concerns during recovery, but otherwise may follow-up on a prn basis.  Amy is in agreement with this plan.     Mary Salcido PA-C

## 2018-06-29 NOTE — PROGRESS NOTES
[facility]   Subjective:  Amy Haq is here for her first postoperative visit.  She underwent Laparoscopic Cholecystectomy with negative cholangiogram by Dr. Hayes on Kaela/15 after admission for pancreatitis.  Pathology revealed: chronic cholecystitis and stones.  She has already seen her PCP for postop check and labs; lipase now WNL, LFT much improved, renal function stable.      She is now 2 weeks postop.  Today she is feeling well, eating well, having normal bowel movements and denies incision concerns.  Her recent medications include: none.  She has no concerns about her recovery today.      Objective:  Abd - soft, non-tender, non-distended  Incs - skin glue has peeled off, healing well, no erythema/bruising, +normal healing ridges/density, no seroma/hematoma noted, no hernia noted    Assessment:  S/p Laparoscopic Cholecystectomy; unremarkable recovery.    Plan:  Amy may continue to slowly advance her activities at this time.  General recommendation is to remain at a 30 lb weight restriction until 3 weeks after surgery.  After that time, she may increase activity as tolerated.  She may continue to utilize OTC pain management options as well as use of ice/heat to sites for comfort.  She should expect progressive resolution of the healing ridge along the incisional sites, normalizing bowel function, and improvement of food intolerances over the following 2-3 months.    Pt is recommended to contact the office if worsening pain, onset of fever/redness at any inc site, or new drainage from the area.  Pt also recommended to call office at any time if ongoing questions/concerns during recovery, but otherwise may follow-up on a prn basis.  Amy is in agreement with this plan.    Mary Salcido PA-C      Please route or send letter to:  PCP

## 2018-10-24 ENCOUNTER — TRANSFERRED RECORDS (OUTPATIENT)
Dept: HEALTH INFORMATION MANAGEMENT | Facility: CLINIC | Age: 68
End: 2018-10-24

## 2019-02-19 ENCOUNTER — OFFICE VISIT (OUTPATIENT)
Dept: INTERNAL MEDICINE | Facility: CLINIC | Age: 69
End: 2019-02-19
Payer: MEDICARE

## 2019-02-19 VITALS
OXYGEN SATURATION: 97 % | WEIGHT: 214.8 LBS | BODY MASS INDEX: 34.52 KG/M2 | DIASTOLIC BLOOD PRESSURE: 74 MMHG | SYSTOLIC BLOOD PRESSURE: 132 MMHG | HEIGHT: 66 IN | RESPIRATION RATE: 18 BRPM | TEMPERATURE: 97.8 F | HEART RATE: 90 BPM

## 2019-02-19 DIAGNOSIS — I10 ESSENTIAL HYPERTENSION: ICD-10-CM

## 2019-02-19 DIAGNOSIS — E21.3 HYPERPARATHYROIDISM (H): ICD-10-CM

## 2019-02-19 DIAGNOSIS — Z01.818 PREOP GENERAL PHYSICAL EXAM: Primary | ICD-10-CM

## 2019-02-19 DIAGNOSIS — H25.9 SENILE CATARACT OF LEFT EYE, UNSPECIFIED AGE-RELATED CATARACT TYPE: ICD-10-CM

## 2019-02-19 LAB
ERYTHROCYTE [DISTWIDTH] IN BLOOD BY AUTOMATED COUNT: 13.6 % (ref 10–15)
HCT VFR BLD AUTO: 42.9 % (ref 35–47)
HGB BLD-MCNC: 13.6 G/DL (ref 11.7–15.7)
MCH RBC QN AUTO: 32.5 PG (ref 26.5–33)
MCHC RBC AUTO-ENTMCNC: 31.7 G/DL (ref 31.5–36.5)
MCV RBC AUTO: 103 FL (ref 78–100)
PLATELET # BLD AUTO: 194 10E9/L (ref 150–450)
RBC # BLD AUTO: 4.18 10E12/L (ref 3.8–5.2)
WBC # BLD AUTO: 5.7 10E9/L (ref 4–11)

## 2019-02-19 PROCEDURE — 85027 COMPLETE CBC AUTOMATED: CPT | Performed by: INTERNAL MEDICINE

## 2019-02-19 PROCEDURE — 80053 COMPREHEN METABOLIC PANEL: CPT | Performed by: INTERNAL MEDICINE

## 2019-02-19 PROCEDURE — 36415 COLL VENOUS BLD VENIPUNCTURE: CPT | Performed by: INTERNAL MEDICINE

## 2019-02-19 PROCEDURE — 99214 OFFICE O/P EST MOD 30 MIN: CPT | Performed by: INTERNAL MEDICINE

## 2019-02-19 PROCEDURE — 84443 ASSAY THYROID STIM HORMONE: CPT | Performed by: INTERNAL MEDICINE

## 2019-02-19 ASSESSMENT — MIFFLIN-ST. JEOR: SCORE: 1521.08

## 2019-02-19 NOTE — PROGRESS NOTES
[place] [address]  [facility] [address]  [phone]  Dept: [phone]    PRE-OP EVALUATION:  Today's date: 2019    Amy Haq (: 1950) presents for pre-operative evaluation for left eye cataract surgery.    Primary Physician: Dr. ZION Chaves  Type of Anesthesia Anticipated: Local with MAC    Patient has a Health Care Directive or Living Will:  NO    Preop Questions 2019   Who is doing your surgery? adiel gonzalez   What are you having done? l eye cataract removal   Date of Surgery/Procedure: 19   Facility or Hospital where procedure/surgery will be performed: [facility] [place]  Fax: [phone]   1.  Do you have a history of Heart attack, stroke, stent, coronary bypass surgery, or other heart surgery? No   2.  Do you ever have any pain or discomfort in your chest? No   3.  Do you have a history of  Heart Failure? No   4.   Are you troubled by shortness of breath when:  walking on a level surface, or up a slight hill, or at night? No   5.  Do you currently have a cold, bronchitis or other respiratory infection? No   6.  Do you have a cough, shortness of breath, or wheezing? No   7.  Do you sometimes get pains in the calves of your legs when you walk? No   8. Do you or anyone in your family have previous history of blood clots? No   9.  Do you or does anyone in your family have a serious bleeding problem such as prolonged bleeding following surgeries or cuts? No   10. Have you ever had problems with anemia or been told to take iron pills? No   11. Have you had any abnormal blood loss such as black, tarry or bloody stools, or abnormal vaginal bleeding? No   12. Have you ever had a blood transfusion? YES -     13. Have you or any of your relatives ever had problems with anesthesia? No   14. Do you have sleep apnea, excessive snoring or daytime drowsiness? No   15. Do you have any prosthetic heart valves? No   16. Do you have prosthetic  joints? No   17. Is there any chance that you may be pregnant? No         HPI:     HPI related to upcoming procedure: decreasing vision years going in for left cataract extraction.      See problem list for active medical problems.  Problems all longstanding and stable, except as noted/documented.  See ROS for pertinent symptoms related to these conditions.                                                                                                                                                          .    MEDICAL HISTORY:     Patient Active Problem List    Diagnosis Date Noted     Pancreatitis 06/14/2018     Priority: Medium     Hypothyroidism, unspecified type 05/16/2018     Priority: Medium     Obesity (BMI 30.0-34.9) 12/07/2016     Priority: Medium     Gout without tophus 05/09/2016     Priority: Medium     Gastroesophageal reflux disease without esophagitis 05/09/2016     Priority: Medium     Bariatric surgery status 12/09/2014     Priority: Medium     Advanced directives, counseling/discussion 02/16/2012     Priority: Medium     Discussed Advance Directive planning with patient; information given to patient to review.         Hyperlipidemia LDL goal <100 03/15/2011     Priority: Medium     CARDIOVASCULAR SCREENING; LDL GOAL LESS THAN 100 10/31/2010     Priority: Medium     Essential hypertension 10/31/2007     Priority: Medium     Problem list name updated by automated process. Provider to review       Hyperparathyroidism (H) 10/31/2007     Priority: Medium     Problem list name updated by automated process. Provider to review       Disorder resulting from impaired renal function 10/31/2007     Priority: Medium     Problem list name updated by automated process. Provider to review        Past Medical History:   Diagnosis Date     Esophageal reflux      Gout      Hypertension     resolved after weight loss     Numbness and tingling     right leg tingling     Renal disease     renal insufficiency      "Thyroid disease      Past Surgical History:   Procedure Laterality Date     APPENDECTOMY       COLONOSCOPY       EXCISE LESION BACK N/A 12/3/2014    Procedure: EXCISE LESION BACK;  Surgeon: Elvin Patiño MD;  Location:  OR     HEAD & NECK SURGERY      Parathyriod tumor removal     HYSTERECTOMY, PAP NO LONGER INDICATED       LAPAROSCOPIC BYPASS GASTRIC N/A 12/3/2014    Procedure: LAPAROSCOPIC BYPASS GASTRIC;  Surgeon: Elvin Patiño MD;  Location:  OR     LAPAROSCOPIC CHOLECYSTECTOMY WITH CHOLANGIOGRAMS N/A 6/15/2018    Procedure: LAPAROSCOPIC CHOLECYSTECTOMY WITH CHOLANGIOGRAMS;  LAPAROSCOPIC CHOLECYSTECTOMY WITH CHOLANGIOGRAMS ;  Surgeon: Deyvi Watkins MD;  Location:  OR     LAPAROSCOPIC LYSIS ADHESIONS N/A 12/3/2014    Procedure: LAPAROSCOPIC LYSIS ADHESIONS;  Surgeon: Elvin Patiño MD;  Location:  OR     PHACOEMULSIFICATION CLEAR CORNEA WITH STANDARD INTRAOCULAR LENS IMPLANT Right 5/29/2018    Procedure: PHACOEMULSIFICATION CLEAR CORNEA WITH STANDARD INTRAOCULAR LENS IMPLANT;  RIGHT EYE PHACOEMULSIFICATION CLEAR CORNEA WITH STANDARD INTRAOCULAR LENS IMPLANT ;  Surgeon: Tyree Mcintyre MD;  Location[facility]     Current Outpatient Medications   Medication Sig Dispense Refill     allopurinol (ZYLOPRIM) 100 MG tablet Take 2 tablets (200 mg) by mouth daily 180 tablet 3     Calcium Carbonate-Vitamin D (CALCIUM + D PO) Take 1 tablet by mouth 2 times daily 600mg + vitamin D       cetirizine (ZYRTEC) 10 MG tablet Take 10 mg by mouth daily.       cyanocobalamin (VITAMIN B12) 1000 MCG/ML injection inject 1 ml subcutaneous every 45 days 2 mL 3     levothyroxine (SYNTHROID/LEVOTHROID) 100 MCG tablet Take 1 tablet (100 mcg) by mouth daily 90 tablet 3     Multiple Vitamins-Minerals (OCUVITE PRESERVISION PO) Take 1 tablet by mouth 2 times daily       ranitidine (ZANTAC) 150 MG tablet Take 1 tablet (150 mg) by mouth 2 times daily 180 tablet 3     syringe/needle, disp, 25G X 1\" " "3 ML MISC Use for B-12 injection 3 each 3     VITAMIN D, CHOLECALCIFEROL, PO Take 2,000 Units by mouth daily as needed        prednisoLONE acetate (PRED FORTE) 1 % ophthalmic susp Place 1 drop into the right eye 3 times daily       OTC products: None, except as noted above    Allergies   Allergen Reactions     Lisinopril      Cough.      Latex Allergy: NO    Social History     Tobacco Use     Smoking status: Former Smoker     Packs/day: 0.50     Years: 35.00     Pack years: 17.50     Types: Cigarettes     Last attempt to quit: 2006     Years since quittin.4     Smokeless tobacco: Never Used   Substance Use Topics     Alcohol use: Yes     Comment: occasionally     History   Drug Use No       REVIEW OF SYSTEMS:   CONSTITUTIONAL: NEGATIVE for fever, chills, change in weight  INTEGUMENTARY/SKIN: NEGATIVE for worrisome rashes, moles or lesions  EYES: NEGATIVE for vision changes or irritation  ENT/MOUTH: NEGATIVE for ear, mouth and throat problems  RESP: NEGATIVE for significant cough or SOB  BREAST: NEGATIVE for masses, tenderness or discharge  CV: NEGATIVE for chest pain, palpitations or peripheral edema  GI: NEGATIVE for nausea, abdominal pain, heartburn, or change in bowel habits  : NEGATIVE for frequency, dysuria, or hematuria  MUSCULOSKELETAL: NEGATIVE for significant arthralgias or myalgia  NEURO: NEGATIVE for weakness, dizziness or paresthesias  ENDOCRINE: NEGATIVE for temperature intolerance, skin/hair changes  HEME: NEGATIVE for bleeding problems  PSYCHIATRIC: NEGATIVE for changes in mood or affect    EXAM:   /74 (BP Location: Left arm, Patient Position: Chair, Cuff Size: Adult Large)   Pulse 90   Temp 97.8  F (36.6  C) (Oral)   Resp 18   Ht 1.676 m (5' 6\")   Wt 97.4 kg (214 lb 12.8 oz)   SpO2 97%   BMI 34.67 kg/m      GENERAL APPEARANCE: healthy, alert and no distress     EYES: EOMI, PERRL     HENT: ear canals and TM's normal and nose and mouth without ulcers or lesions     NECK: no " adenopathy, no asymmetry, masses, or scars and thyroid normal to palpation     RESP: lungs clear to auscultation - no rales, rhonchi or wheezes     CV: regular rates and rhythm, normal S1 S2, no S3 or S4 and no murmur, click or rub     ABDOMEN:  soft, nontender, no HSM or masses and bowel sounds normal     MS: extremities normal- no gross deformities noted, no evidence of inflammation in joints, FROM in all extremities.     SKIN: no suspicious lesions or rashes     NEURO: Normal strength and tone, sensory exam grossly normal, mentation intact and speech normal     PSYCH: mentation appears normal. and affect normal/bright     LYMPHATICS: No cervical adenopathy    DIAGNOSTICS:     Labs Drawn and in Process:   Unresulted Labs Ordered in the Past 30 Days of this Admission     No orders found from 12/21/2018 to 2/20/2019.          Recent Labs   Lab Test 06/22/18  0910 06/15/18  0601 06/14/18  0619 06/13/18  2205  05/28/14  0814   HGB  --   --  12.9 14.0   < >  --    PLT  --   --  194 225   < >  --    INR  --  1.17*  --   --   --  1.01    143 145* 140   < >  --    POTASSIUM 4.1 3.9 4.3 3.8   < >  --    CR 1.32* 1.12* 1.11* 1.15*   < >  --    A1C  --   --   --   --   --  5.7    < > = values in this interval not displayed.        IMPRESSION:   Reason for surgery/procedure: left cataract extraction  Diagnosis/reason for consult: hyperparathyroidism, HTN    The proposed surgical procedure is considered LOW risk.    REVISED CARDIAC RISK INDEX  The patient has the following serious cardiovascular risks for perioperative complications such as (MI, PE, VFib and 3  AV Block):  No serious cardiac risks  INTERPRETATION: 1 risks: Class II (low risk - 0.9% complication rate)    The patient has the following additional risks for perioperative complications:  No identified additional risks    No diagnosis found.    RECOMMENDATIONS:         --Patient is to take all scheduled medications on the day of surgery EXCEPT for  modifications listed below.    APPROVAL GIVEN to proceed with proposed procedure, without further diagnostic evaluation       Signed Electronically by: Mouna Chaves MD    Copy of this evaluation report is provided to requesting physician.    [place] Preop Guidelines    Revised Cardiac Risk Index

## 2019-02-20 LAB
ALBUMIN SERPL-MCNC: 3.5 G/DL (ref 3.4–5)
ALP SERPL-CCNC: 109 U/L (ref 40–150)
ALT SERPL W P-5'-P-CCNC: 38 U/L (ref 0–50)
ANION GAP SERPL CALCULATED.3IONS-SCNC: 5 MMOL/L (ref 3–14)
AST SERPL W P-5'-P-CCNC: 23 U/L (ref 0–45)
BILIRUB SERPL-MCNC: 0.6 MG/DL (ref 0.2–1.3)
BUN SERPL-MCNC: 26 MG/DL (ref 7–30)
CALCIUM SERPL-MCNC: 9.2 MG/DL (ref 8.5–10.1)
CHLORIDE SERPL-SCNC: 112 MMOL/L (ref 94–109)
CO2 SERPL-SCNC: 26 MMOL/L (ref 20–32)
CREAT SERPL-MCNC: 1.4 MG/DL (ref 0.52–1.04)
GFR SERPL CREATININE-BSD FRML MDRD: 38 ML/MIN/{1.73_M2}
GLUCOSE SERPL-MCNC: 96 MG/DL (ref 70–99)
POTASSIUM SERPL-SCNC: 4.3 MMOL/L (ref 3.4–5.3)
PROT SERPL-MCNC: 6.9 G/DL (ref 6.8–8.8)
SODIUM SERPL-SCNC: 143 MMOL/L (ref 133–144)
TSH SERPL DL<=0.005 MIU/L-ACNC: 2.4 MU/L (ref 0.4–4)

## 2019-05-07 DIAGNOSIS — K91.2 POSTOPERATIVE MALABSORPTION: ICD-10-CM

## 2019-05-07 RX ORDER — CYANOCOBALAMIN 1000 UG/ML
INJECTION, SOLUTION INTRAMUSCULAR; SUBCUTANEOUS
Qty: 2 ML | Refills: 0 | Status: SHIPPED | OUTPATIENT
Start: 2019-05-07

## 2019-05-07 NOTE — TELEPHONE ENCOUNTER
Medication is being filled for 1 time refill only due to:  Patient needs labs Vit D labs should be done yearly.

## 2019-05-07 NOTE — TELEPHONE ENCOUNTER
"Requested Prescriptions   Pending Prescriptions Disp Refills     cyanocobalamin (CYANOCOBALAMIN) 1000 MCG/ML injection [Pharmacy Med   Name: Cyanocobalamin Injection Solution 1000 MCG/ML]    Last Written Prescription Date:  5/16/18  Last Fill Quantity: 2ml,  # refills: 3   Last office visit: 2/19/2019 with prescribing provider:  Anastacio   Future Office Visit:     2 mL 2     Sig: inject 1 ml subcutaneous every 45 days       Vitamin Supplements (Adult) Protocol Passed - 5/7/2019  7:01 AM        Passed - High dose Vitamin D not ordered        Passed - Recent (12 mo) or future (30 days) visit within the authorizing provider's specialty     Patient had office visit in the last 12 months or has a visit in the next 30 days with authorizing provider or within the authorizing provider's specialty.  See \"Patient Info\" tab in inbasket, or \"Choose Columns\" in Meds & Orders section of the refill encounter.              Passed - Medication is active on med list          "

## 2019-05-13 ENCOUNTER — E-VISIT (OUTPATIENT)
Dept: INTERNAL MEDICINE | Facility: CLINIC | Age: 69
End: 2019-05-13
Payer: MEDICARE

## 2019-05-13 DIAGNOSIS — N30.00 ACUTE CYSTITIS WITHOUT HEMATURIA: Primary | ICD-10-CM

## 2019-05-13 PROCEDURE — 99444 ZZC PHYSICIAN ONLINE EVALUATION & MANAGEMENT SERVICE: CPT | Performed by: INTERNAL MEDICINE

## 2019-05-13 RX ORDER — SULFAMETHOXAZOLE/TRIMETHOPRIM 800-160 MG
1 TABLET ORAL 2 TIMES DAILY
Qty: 14 TABLET | Refills: 0 | Status: SHIPPED | OUTPATIENT
Start: 2019-05-13 | End: 2019-05-22

## 2019-05-14 DIAGNOSIS — Z98.84 BARIATRIC SURGERY STATUS: ICD-10-CM

## 2019-05-14 DIAGNOSIS — K21.9 GASTROESOPHAGEAL REFLUX DISEASE WITHOUT ESOPHAGITIS: ICD-10-CM

## 2019-05-14 NOTE — TELEPHONE ENCOUNTER
"Requested Prescriptions   Pending Prescriptions Disp Refills     ranitidine (ZANTAC) 150 MG tablet [Pharmacy Med Name: raNITIdine HCl Oral   Tablet 150 MG]    Last Written Prescription Date:  5/16/18  Last Fill Quantity: 180,  # refills: 3   Last office visit: 2/19/2019 with prescribing provider:  Anastacio   Future Office Visit:   Next 5 appointments (look out 90 days)    May 22, 2019  8:20 AM CDT  MyChart Physical Adult with Mouna Chaves MD  [facility] ([facility]) [address]  [facility] [address]  [phone]          180 tablet 2     Sig: Take 1 tablet (150 mg) by mouth 2 times daily       H2 Blockers Protocol Passed - 5/14/2019  7:00 AM        Passed - Patient is age 12 or older        Passed - Recent (12 mo) or future (30 days) visit within the authorizing provider's specialty     Patient had office visit in the last 12 months or has a visit in the next 30 days with authorizing provider or within the authorizing provider's specialty.  See \"Patient Info\" tab in inbasket, or \"Choose Columns\" in Meds & Orders section of the refill encounter.              Passed - Medication is active on med list          "

## 2019-05-15 NOTE — TELEPHONE ENCOUNTER
Prescription approved per [facility] Refill Protocol.    Next 5 appointments (look out 90 days)    May 22, 2019  8:20 AM CDT  MyChart Physical Adult with Mouna Chaves MD  [facility] ([facility]) [address]  [facility] [address]  [phone]

## 2019-05-21 ASSESSMENT — ENCOUNTER SYMPTOMS
NERVOUS/ANXIOUS: 0
HEARTBURN: 0
BREAST MASS: 0
JOINT SWELLING: 0
DYSURIA: 0
ABDOMINAL PAIN: 0
HEADACHES: 0
DIZZINESS: 0
HEMATURIA: 0
CONSTIPATION: 0
WEAKNESS: 0
CHILLS: 0
FEVER: 0
SORE THROAT: 0
COUGH: 0
PARESTHESIAS: 0
FREQUENCY: 0
NAUSEA: 0
MYALGIAS: 1
EYE PAIN: 0
SHORTNESS OF BREATH: 0
ARTHRALGIAS: 1
HEMATOCHEZIA: 0
PALPITATIONS: 0
DIARRHEA: 0

## 2019-05-21 ASSESSMENT — ACTIVITIES OF DAILY LIVING (ADL): CURRENT_FUNCTION: NO ASSISTANCE NEEDED

## 2019-05-22 ENCOUNTER — OFFICE VISIT (OUTPATIENT)
Dept: INTERNAL MEDICINE | Facility: CLINIC | Age: 69
End: 2019-05-22
Payer: MEDICARE

## 2019-05-22 VITALS
RESPIRATION RATE: 16 BRPM | TEMPERATURE: 98.4 F | SYSTOLIC BLOOD PRESSURE: 138 MMHG | OXYGEN SATURATION: 99 % | HEART RATE: 80 BPM | WEIGHT: 217.4 LBS | DIASTOLIC BLOOD PRESSURE: 80 MMHG | HEIGHT: 66 IN | BODY MASS INDEX: 34.94 KG/M2

## 2019-05-22 DIAGNOSIS — Z98.84 BARIATRIC SURGERY STATUS: ICD-10-CM

## 2019-05-22 DIAGNOSIS — Z00.00 PREVENTATIVE HEALTH CARE: Primary | ICD-10-CM

## 2019-05-22 DIAGNOSIS — M79.671 RIGHT FOOT PAIN: ICD-10-CM

## 2019-05-22 DIAGNOSIS — M10.9 GOUT WITHOUT TOPHUS: ICD-10-CM

## 2019-05-22 DIAGNOSIS — E53.8 VITAMIN B12 DEFICIENCY (NON ANEMIC): ICD-10-CM

## 2019-05-22 DIAGNOSIS — E03.9 ACQUIRED HYPOTHYROIDISM: ICD-10-CM

## 2019-05-22 DIAGNOSIS — I10 ESSENTIAL HYPERTENSION: ICD-10-CM

## 2019-05-22 DIAGNOSIS — N18.30 CKD (CHRONIC KIDNEY DISEASE) STAGE 3, GFR 30-59 ML/MIN (H): ICD-10-CM

## 2019-05-22 DIAGNOSIS — E66.01 MORBID OBESITY (H): ICD-10-CM

## 2019-05-22 DIAGNOSIS — K21.9 GASTROESOPHAGEAL REFLUX DISEASE WITHOUT ESOPHAGITIS: ICD-10-CM

## 2019-05-22 DIAGNOSIS — E21.3 HYPERPARATHYROIDISM (H): ICD-10-CM

## 2019-05-22 LAB — FOLATE SERPL-MCNC: 44.9 NG/ML

## 2019-05-22 PROCEDURE — 82746 ASSAY OF FOLIC ACID SERUM: CPT | Performed by: INTERNAL MEDICINE

## 2019-05-22 PROCEDURE — 36415 COLL VENOUS BLD VENIPUNCTURE: CPT | Performed by: INTERNAL MEDICINE

## 2019-05-22 PROCEDURE — 83735 ASSAY OF MAGNESIUM: CPT | Performed by: INTERNAL MEDICINE

## 2019-05-22 PROCEDURE — G0439 PPPS, SUBSEQ VISIT: HCPCS | Performed by: INTERNAL MEDICINE

## 2019-05-22 PROCEDURE — 80061 LIPID PANEL: CPT | Performed by: INTERNAL MEDICINE

## 2019-05-22 PROCEDURE — 99000 SPECIMEN HANDLING OFFICE-LAB: CPT | Performed by: INTERNAL MEDICINE

## 2019-05-22 PROCEDURE — 84425 ASSAY OF VITAMIN B-1: CPT | Mod: 90 | Performed by: INTERNAL MEDICINE

## 2019-05-22 PROCEDURE — 82306 VITAMIN D 25 HYDROXY: CPT | Performed by: INTERNAL MEDICINE

## 2019-05-22 PROCEDURE — 99213 OFFICE O/P EST LOW 20 MIN: CPT | Mod: 25 | Performed by: INTERNAL MEDICINE

## 2019-05-22 RX ORDER — LEVOTHYROXINE SODIUM 100 UG/1
100 TABLET ORAL DAILY
Qty: 90 TABLET | Refills: 3 | Status: SHIPPED | OUTPATIENT
Start: 2019-05-22

## 2019-05-22 RX ORDER — ALLOPURINOL 100 MG/1
200 TABLET ORAL DAILY
Qty: 180 TABLET | Refills: 3 | Status: SHIPPED | OUTPATIENT
Start: 2019-05-22

## 2019-05-22 RX ORDER — LANOLIN ALCOHOL/MO/W.PET/CERES
1000 CREAM (GRAM) TOPICAL DAILY
Qty: 90 TABLET | Refills: 3 | Status: SHIPPED | OUTPATIENT
Start: 2019-05-22

## 2019-05-22 ASSESSMENT — ENCOUNTER SYMPTOMS
DYSURIA: 0
HEMATOCHEZIA: 0
CONSTIPATION: 0
DIARRHEA: 0
FEVER: 0
WEAKNESS: 0
SHORTNESS OF BREATH: 0
MYALGIAS: 1
CHILLS: 0
HEMATURIA: 0
DIZZINESS: 0
HEARTBURN: 0
SORE THROAT: 0
EYE PAIN: 0
NAUSEA: 0
JOINT SWELLING: 0
PARESTHESIAS: 0
ARTHRALGIAS: 1
HEADACHES: 0
BREAST MASS: 0
FREQUENCY: 0
COUGH: 0
PALPITATIONS: 0
NERVOUS/ANXIOUS: 0
ABDOMINAL PAIN: 0

## 2019-05-22 ASSESSMENT — MIFFLIN-ST. JEOR: SCORE: 1528.9

## 2019-05-22 ASSESSMENT — ACTIVITIES OF DAILY LIVING (ADL): CURRENT_FUNCTION: NO ASSISTANCE NEEDED

## 2019-05-22 NOTE — PROGRESS NOTES
"SUBJECTIVE:   Amy Haq is a 68 year old female who presents for Preventive Visit.  Fasting. BPs at home in the 130s/70s. Right foot problem.   Are you in the first 12 months of your Medicare coverage?  No    Healthy Habits:     In general, how would you rate your overall health?  Good    Frequency of exercise:  1 day/week    Duration of exercise:  30-45 minutes    Do you usually eat at least 4 servings of fruit and vegetables a day, include whole grains    & fiber and avoid regularly eating high fat or \"junk\" foods?  No    Taking medications regularly:  Yes    Medication side effects:  None    Ability to successfully perform activities of daily living:  No assistance needed    Home Safety:  Throw rugs in the hallway    Hearing Impairment:  Difficulty following a conversation in a noisy restaurant or crowded room and difficulty understanding soft or whispered speech    In the past 6 months, have you been bothered by leaking of urine?  No    In general, how would you rate your overall mental or emotional health?  Good      PHQ-2 Total Score: 0    Additional concerns today:  Yes    Do you feel safe in your environment? Yes    Do you have a Health Care Directive? No: Advance care planning reviewed with patient; information given to patient to review.    Fall risk  Fallen 2 or more times in the past year?: No  Any fall with injury in the past year?: No    Cognitive Screening   1) Repeat 3 items (Leader, Season, Table)    2) Clock draw: NORMAL  3) 3 item recall: Recalls 2 objects   Results: NORMAL clock, 1-2 items recalled: COGNITIVE IMPAIRMENT LESS LIKELY    Mini-CogTM Copyright [employer]. Licensed by the author for use in [facility]; reprinted with permission (hany@.[facility]). All rights reserved.      Do you have sleep apnea, excessive snoring or daytime drowsiness?: no    Reviewed and updated as needed this visit by clinical staff  Tobacco  Allergies  Meds  Med Hx  Surg Hx  Fam Hx  Soc Hx    "     Reviewed and updated as needed this visit by Provider        Social History     Tobacco Use     Smoking status: Former Smoker     Packs/day: 0.50     Years: 35.00     Pack years: 17.50     Types: Cigarettes     Last attempt to quit: 2006     Years since quittin.6     Smokeless tobacco: Never Used   Substance Use Topics     Alcohol use: Yes     Comment: occasionally     If you drink alcohol do you typically have >3 drinks per day or >7 drinks per week? No    Alcohol Use 2019   Prescreen: >3 drinks/day or >7 drinks/week? No   Prescreen: >3 drinks/day or >7 drinks/week? -       Current providers sharing in care for this patient include:   Patient Care Team:  Francisca Sainz APRN CNP as PCP - General (Nurse Practitioner - Family)  Francisca Sainz APRN CNP as Assigned PCP    The following health maintenance items are reviewed in Epic and correct as of today:  Health Maintenance   Topic Date Due     DEXA  1950     HEPATITIS C SCREENING  1950     ADVANCED DIRECTIVE PLANNING  1950     MAMMO SCREENING  1950     COLONOSCOPY  08/10/1960     LIPID  08/10/1995     DTAP/TDAP/TD IMMUNIZATION (2 - Td) 10/30/2018     PHQ-2  2019     FALL RISK ASSESSMENT  2019     MEDICARE ANNUAL WELLNESS VISIT  2019     INFLUENZA VACCINE  Completed     PNEUMOCOCCAL IMMUNIZATION 65+ LOW/MEDIUM RISK  Completed     ZOSTER IMMUNIZATION  Completed     IPV IMMUNIZATION  Aged Out     MENINGITIS IMMUNIZATION  Aged Out     BP Readings from Last 3 Encounters:   19 138/80   19 132/74   18 110/80    Wt Readings from Last 3 Encounters:   19 98.6 kg (217 lb 6.4 oz)   19 97.4 kg (214 lb 12.8 oz)   18 93.4 kg (206 lb)                      Review of Systems   Constitutional: Negative for chills and fever.   HENT: Positive for hearing loss. Negative for congestion, ear pain and sore throat.    Eyes: Negative for pain and visual disturbance.   Respiratory:  "Negative for cough and shortness of breath.    Cardiovascular: Negative for chest pain, palpitations and peripheral edema.   Gastrointestinal: Negative for abdominal pain, constipation, diarrhea, heartburn, hematochezia and nausea.   Breasts:  Negative for tenderness, breast mass and discharge.   Genitourinary: Negative for dysuria, frequency, genital sores, hematuria, pelvic pain, urgency, vaginal bleeding and vaginal discharge.   Musculoskeletal: Positive for arthralgias and myalgias. Negative for joint swelling.   Skin: Negative for rash.   Neurological: Negative for dizziness, weakness, headaches and paresthesias.   Psychiatric/Behavioral: Negative for mood changes. The patient is not nervous/anxious.      HPI:   She has been getting numbness and pain between there 2nd and 3rd toes on the right foot. Denies any trauma to the area or unusual physical activities that could have caused it.     She claire gastric bypass surgery many years ago. Has been years since full nutritional labs have been checked. She is on B12 shots would like to change to PO and see if she can maintain.     She is finally getting serious about loosing wt is working on a regular diet plan. Does not want to go back on blood pressure medications.    OBJECTIVE:   There were no vitals taken for this visit. Estimated body mass index is 34.67 kg/m  as calculated from the following:    Height as of 2/19/19: 1.676 m (5' 6\").    Weight as of 2/19/19: 97.4 kg (214 lb 12.8 oz).  Physical Exam  GENERAL: healthy, alert and no distress  EYES: Eyes grossly normal to inspection, PERRL and conjunctivae and sclerae normal  HENT: ear canals and TM's normal, nose and mouth without ulcers or lesions  NECK: no adenopathy, no asymmetry, masses, or scars and thyroid normal to palpation  RESP: lungs clear to auscultation - no rales, rhonchi or wheezes  BREAST: normal without masses, tenderness or nipple discharge and no palpable axillary masses or adenopathy  CV: " "regular rate and rhythm, normal S1 S2, no S3 or S4, no murmur, click or rub, no peripheral edema and peripheral pulses strong  ABDOMEN: soft, nontender, no hepatosplenomegaly, no masses and bowel sounds normal  MS: no gross musculoskeletal defects noted, no edema  SKIN: no suspicious lesions or rashes  NEURO: Normal strength and tone, mentation intact and speech normal  PSYCH: mentation appears normal, affect normal/bright        ASSESSMENT / PLAN:   1. Preventative health care       2. Right foot pain  Will refer to   - ORTHO  REFERRAL  - OFFICE/OUTPT VISIT,EST,LEVL III    3. Bariatric surgery status  Will check   - syringe/needle, disp, 25G X 1\" 3 ML MISC; Use for B-12 injection  Dispense: 3 each; Refill: 3  - ranitidine (ZANTAC) 150 MG tablet; Take 1 tablet (150 mg) by mouth 2 times daily  Dispense: 180 tablet; Refill: 3  - Magnesium  - Folate  - Vitamin B1 whole blood  - cyanocobalamin (VITAMIN B-12) 1000 MCG tablet; Take 1 tablet (1,000 mcg) by mouth daily  Dispense: 90 tablet; Refill: 3  - OFFICE/OUTPT VISIT,EST,LEVL III    4. Morbid obesity (H)  Continue to encourage wt loss  - OFFICE/OUTPT VISIT,EST,LEVL III    5. Vitamin B12 deficiency (non anemic)  Will try her on PO and recheck B12 q 6 months  - cyanocobalamin (VITAMIN B-12) 1000 MCG tablet; Take 1 tablet (1,000 mcg) by mouth daily  Dispense: 90 tablet; Refill: 3  - OFFICE/OUTPT VISIT,EST,LEVL III    6. Essential hypertension  Leave off medication for now. Follow up in 6 months   - Lipid Profile  - OFFICE/OUTPT VISIT,EST,LEVL III    7. Gout without tophus     - allopurinol (ZYLOPRIM) 100 MG tablet; Take 2 tablets (200 mg) by mouth daily  Dispense: 180 tablet; Refill: 3    8. Acquired hypothyroidism     - levothyroxine (SYNTHROID/LEVOTHROID) 100 MCG tablet; Take 1 tablet (100 mcg) by mouth daily  Dispense: 90 tablet; Refill: 3    9. Gastroesophageal reflux disease without esophagitis     - ranitidine (ZANTAC) 150 MG tablet; Take 1 tablet (150 mg) " "by mouth 2 times daily  Dispense: 180 tablet; Refill: 3  - GASTROENTEROLOGY ADULT REF PROCEDURE ONLY [facility] [phone]; No Provider Preference    10. CKD (chronic kidney disease) stage 3, GFR 30-59 ml/min (H)       11. Hyperparathyroidism (H)   due for  - Vitamin D Deficiency    End of Life Planning:  Patient currently has an advanced directive: No.  I have verified the patient's ablity to prepare an advanced directive/make health care decisions.  Literature was provided to assist patient in preparing an advanced directive.    COUNSELING:  Reviewed preventive health counseling, as reflected in patient instructions       Regular exercise       Healthy diet/nutrition    Estimated body mass index is 34.67 kg/m  as calculated from the following:    Height as of 2/19/19: 1.676 m (5' 6\").    Weight as of 2/19/19: 97.4 kg (214 lb 12.8 oz).    Weight management plan: Discussed healthy diet and exercise guidelines     reports that she quit smoking about 12 years ago. Her smoking use included cigarettes. She has a 17.50 pack-year smoking history. She has never used smokeless tobacco.      Appropriate preventive services were discussed with this patient, including applicable screening as appropriate for cardiovascular disease, diabetes, osteopenia/osteoporosis, and glaucoma.  As appropriate for age/gender, discussed screening for colorectal cancer, prostate cancer, breast cancer, and cervical cancer. Checklist reviewing preventive services available has been given to the patient.    Reviewed patients plan of care and provided an AVS. The Basic Care Plan (routine screening as documented in Health Maintenance) for Amy meets the Care Plan requirement. This Care Plan has been established and reviewed with the Patient.    Counseling Resources:  ATP IV Guidelines  Pooled Cohorts Equation Calculator  Breast Cancer Risk Calculator  FRAX Risk Assessment  ICSI Preventive Guidelines  Dietary Guidelines for Americans, " 2010  USDA's MyPlate  ASA Prophylaxis  Lung CA Screening    Mouna Chaves MD  [facility]    Identified Health Risks:

## 2019-05-23 LAB
CHOLEST SERPL-MCNC: 173 MG/DL
DEPRECATED CALCIDIOL+CALCIFEROL SERPL-MC: 51 UG/L (ref 20–75)
HDLC SERPL-MCNC: 47 MG/DL
LDLC SERPL CALC-MCNC: 100 MG/DL
MAGNESIUM SERPL-MCNC: 2.4 MG/DL (ref 1.6–2.3)
NONHDLC SERPL-MCNC: 126 MG/DL
TRIGL SERPL-MCNC: 132 MG/DL

## 2019-05-24 LAB — VIT B1 BLD-MCNC: 95 NMOL/L (ref 70–180)

## 2019-06-04 ENCOUNTER — OFFICE VISIT (OUTPATIENT)
Dept: PODIATRY | Facility: CLINIC | Age: 69
End: 2019-06-04
Payer: MEDICARE

## 2019-06-04 VITALS
DIASTOLIC BLOOD PRESSURE: 80 MMHG | WEIGHT: 217.4 LBS | SYSTOLIC BLOOD PRESSURE: 130 MMHG | HEIGHT: 66 IN | BODY MASS INDEX: 34.94 KG/M2

## 2019-06-04 DIAGNOSIS — M77.51 CAPSULITIS OF METATARSOPHALANGEAL (MTP) JOINT OF RIGHT FOOT: ICD-10-CM

## 2019-06-04 DIAGNOSIS — M21.611 BUNION, RIGHT: Primary | ICD-10-CM

## 2019-06-04 PROCEDURE — 99203 OFFICE O/P NEW LOW 30 MIN: CPT | Performed by: PODIATRIST

## 2019-06-04 ASSESSMENT — MIFFLIN-ST. JEOR: SCORE: 1528.9

## 2019-06-04 NOTE — PATIENT INSTRUCTIONS
Thank you for choosing [facility]!    DR. RICHARDSON'S CLINIC LOCATIONS:   MONDAY - [place] TUESDAY - [place]   [address] [facility]  [address]   [place] [address] [place] [address]   [phone]       THURSDAY AM - [place] THURSDAY PM - [facility]   [address]   [place] [address] [place], [place] [address] [phone]       FRIDAY AM - [place] SET UP SURGERY: [phone] [address] APPOINTMENTS: [phone]   [place] [address] BILLING QUESTIONS: [phone] FAX NUMBER: [phone]     Follow Up: 3 weeks in [place]    OVER THE COUNTER INSERTS    SuperFeet   Sofsole Fit Spenco   Power Step   Walk-Fit Arch Cradles     Most of these can be found at your local [employer], [employer], or online:    1.  https://www.Clean Mobile.Flattr/en-us/  2.  Https://Citymaps/  3.  Https://www.powersteps.com/    **A good high quality over the counter insert should cost around $40-$50        PRICE THERAPY  Many aches and pains throughout the foot and ankle can be helped with many simple treatments. This is usually described as PRICE Therapy.      P - Protection - often times, inflammation/pain in the lower extremity is not able to improve simply because the areas involved are never allowed to rest. Every step we take can bother the problematic area. Protecting those areas is an important step in the healing process. This may involve a walking cast boot, a special insert/orthotic device, an ankle brace, or simply avoiding barefoot walking.    R - Rest - in addition to protecting the foot/ankle, resting is an important, but often times difficult, treatment option. Getting off your feet when they bother you, and specifically avoiding activities that cause pain/discomfort, are very beneficial to prevent, and treat, foot/ankle pain.      I - Ice - icing regularly  can help to decrease inflammation and swelling in the foot, thus decreasing pain. Using an ice pack or a bag of frozen veggies works very well. Ice for 20 minutes multiple times per day as needed.  Do not place the ice directly on the skin as this can cause tissue damage.    C - Compression - using a compression wrap or an ACE wrap can help to decrease swelling, which can help to decrease pain. Wearing the wraps is generally not needed at night, but they should be worn on a regular basis when you are going to be on your feet for prolonged periods as gravity tends to pull fluids down to your feet/ankles.    E - Elevation - elevating your lower extremities multiple times daily for 15-20 minutes can help to decrease swelling, which works well in decreasing pain levels.    NSAID/Tylenol - Anti-inflammatories like Aleve or ibuprofen, and/or a pain medication, such as Tylenol, can help to improve pain levels and get the issue resolved sooner rather than later. Anyone with liver issues should be careful with Tylenol, and anyone with high blood pressure or heart, stomach or kidney issues should be careful with anti-inflammatories. Please ask if you have questions about these medications, including dosage.    CAPSULITIS / METATARSALGIA  All joints in the body are surrounded by a capsule, or a covering of soft tissue and ligaments. The capsule holds bones together and secretes joint fluid to help lubricate the joint. If a joint capsule is exposed to excessive force, it can develop microscopic tears and become inflamed. This commonly occurs in the foot due to mild variation in anatomy. Hammertoes, bunions, irregular bone length, joint immobility, etc. can all lead to excessive force on the joint. Capsule injury can also occur due to repetitive stress from exercise, insufficient support from shoes, excessive bare foot walking and excessive weight.      Conservative treatments include ice, rest from the aggravating activity,  weight loss, orthotic inserts, improving shoes and shoe modifications. Appropriate shoes will protect the inflamed tissue improving the chances of healing. Avoidance of standing or walking barefoot, including around the house, is necessary to allow healing. Casts are sometimes used for more aggressive protection.  NSAIDs such as Advil are also used to help with pain and decreasing inflammation. If pain continues over a period of weeks with continuous rest and icing, Corticosteroid injections can be a treatment option to try and help decrease inflammation.    Surgery is often necessary to correct the underlying structural problem. Surgery might include shortening an excessively long bone, repairing bunion or hammertoe, lengthening a tight Achilles  tendon, etc. These are same day surgeries that might be pursued if more conservative measures fail to provide relief.      The inflamed joint capsule has the potential to completely tear. This will allow the toe to drift off the ground, curving toward the other toes. The involved toe may under or overlap the adjacent toes as drift continues. The pain may improve after the joint tears or this new position will be permanent. Surgery can address the toe alignment. Your goal of treating capsulitis is to avoid this scenario.          BODY WEIGHT AND YOUR FEET  The following information is included in the after visit summary for all patients. Body weight can be a sensitive issue to discuss in clinic, but we think the following information is very important. Although we focus on the feet and ankles, we do support the overall health of our patients.     Many things can cause foot and ankle problems. Foot structure, activity level, foot mechanics and injuries are common causes of pain. One very important issue that often goes unmentioned, is body weight. Extra weight can cause increased stress on muscles, ligaments, bones and tendons. Sometimes just a few extra pounds is all it  takes to put one over her/his threshold. Without reducing that stress, it can be difficult to alleviate pain. As Foot & Ankle specialists, our job is addressing the lower extremity problem and possible causes. Regarding extra body weight, we encourage patients to discuss diet and weight management plans with their primary care doctors. It is this team approach that gives you the best opportunity for pain relief and getting you back on your feet.      [facility] has a Comprehensive Weight Management Program. This program includes counseling, education, non-surgical and surgical approaches to weight loss. If you are interested in learning more either talk to you primary care provider or call [phone].

## 2019-06-04 NOTE — PROGRESS NOTES
"Foot & Ankle Surgery  June 4, 2019    CC: \"R foot - bottom feels lumpy under toes c intermittent sharp toe pain\"    I was asked to see Amy Haq regarding the chief complaint by:  Dr. Chaves    HPI:  Pt is a 68 year old female who presents with above complaint.  Right forefoot pain x 6 months.  Trip last Oct, was walking over cobblestone roads and it \"really flared up\".  \"sharp shooting pain\", worse with activity.  Has tried massage.  She also mentions her right bunion, \"not that bad\".      ROS:   Pos for CC.  The patient denies current nausea, vomiting, chills, fevers, belly pain, calf pain, chest pain or SOB.  Complete remainder of ROS is otherwise neg.    VITALS:    Vitals:    06/04/19 0905   BP: 130/80   Weight: 98.6 kg (217 lb 6.4 oz)   Height: 1.67 m (5' 5.75\")       PMH:    Past Medical History:   Diagnosis Date     Esophageal reflux      Gout      Hypertension     resolved after weight loss     Numbness and tingling     right leg tingling     Renal disease     renal insufficiency     Thyroid disease        SXHX:    Past Surgical History:   Procedure Laterality Date     APPENDECTOMY       COLONOSCOPY       EXCISE LESION BACK N/A 12/3/2014    Procedure: EXCISE LESION BACK;  Surgeon: Elvin Patiño MD;  Location:  OR     HEAD & NECK SURGERY      Parathyriod tumor removal     HYSTERECTOMY, PAP NO LONGER INDICATED       LAPAROSCOPIC BYPASS GASTRIC N/A 12/3/2014    Procedure: LAPAROSCOPIC BYPASS GASTRIC;  Surgeon: Elvin Patiño MD;  Location:  OR     LAPAROSCOPIC CHOLECYSTECTOMY WITH CHOLANGIOGRAMS N/A 6/15/2018    Procedure: LAPAROSCOPIC CHOLECYSTECTOMY WITH CHOLANGIOGRAMS;  LAPAROSCOPIC CHOLECYSTECTOMY WITH CHOLANGIOGRAMS ;  Surgeon: Deyvi Watkins MD;  Location:  OR     LAPAROSCOPIC LYSIS ADHESIONS N/A 12/3/2014    Procedure: LAPAROSCOPIC LYSIS ADHESIONS;  Surgeon: Elvin Patiño MD;  Location:  OR     PHACOEMULSIFICATION CLEAR CORNEA WITH STANDARD INTRAOCULAR " "LENS IMPLANT Right 5/29/2018    Procedure: PHACOEMULSIFICATION CLEAR CORNEA WITH STANDARD INTRAOCULAR LENS IMPLANT;  RIGHT EYE PHACOEMULSIFICATION CLEAR CORNEA WITH STANDARD INTRAOCULAR LENS IMPLANT ;  Surgeon: Tyree Mcintyre MD;  Location[facility]        MEDS:    Current Outpatient Medications   Medication     allopurinol (ZYLOPRIM) 100 MG tablet     Calcium Carbonate-Vitamin D (CALCIUM + D PO)     cetirizine (ZYRTEC) 10 MG tablet     cyanocobalamin (CYANOCOBALAMIN) 1000 MCG/ML injection     cyanocobalamin (VITAMIN B-12) 1000 MCG tablet     levothyroxine (SYNTHROID/LEVOTHROID) 100 MCG tablet     Multiple Vitamins-Minerals (OCUVITE PRESERVISION PO)     ranitidine (ZANTAC) 150 MG tablet     syringe/needle, disp, 25G X 1\" 3 ML MISC     VITAMIN D, CHOLECALCIFEROL, PO     No current facility-administered medications for this visit.        ALL:     Allergies   Allergen Reactions     Lisinopril      Cough.       FMH:    Family History   Problem Relation Age of Onset     Cerebrovascular Disease Mother         born 1923 at age 76     Family History Negative Father         born 1923     Alzheimer Disease Father      Family History Negative Brother      Family History Negative Sister      Family History Negative Daughter      Diabetes Other 18     Colitis Other 15        large colon removed- no ostomy       SocHx:    Social History     Socioeconomic History     Marital status:      Spouse name: Not on file     Number of children: 2     Years of education: Not on file     Highest education level: Not on file   Occupational History     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     Employer: [employer]   Social Needs     Financial resource strain: Not on file     Food insecurity:     Worry: Not on file     Inability: Not on file     Transportation needs:     Medical: Not on file     Non-medical: Not on file   Tobacco Use     Smoking status: Former Smoker     Packs/day: 0.50     Years: 35.00     " Pack years: 17.50     Types: Cigarettes     Last attempt to quit: 2006     Years since quittin.6     Smokeless tobacco: Never Used   Substance and Sexual Activity     Alcohol use: Yes     Comment: occasionally     Drug use: No     Sexual activity: Yes     Partners: Male   Lifestyle     Physical activity:     Days per week: Not on file     Minutes per session: Not on file     Stress: Not on file   Relationships     Social connections:     Talks on phone: Not on file     Gets together: Not on file     Attends Christianity service: Not on file     Active member of club or organization: Not on file     Attends meetings of clubs or organizations: Not on file     Relationship status: Not on file     Intimate partner violence:     Fear of current or ex partner: Not on file     Emotionally abused: Not on file     Physically abused: Not on file     Forced sexual activity: Not on file   Other Topics Concern     Parent/sibling w/ CABG, MI or angioplasty before 65F 55M? Not Asked   Social History Narrative     Not on file           EXAMINATION:  Gen:   No apparent distress  Neuro:   A&Ox3, no deficits  Psych:    Answering questions appropriately for age and situation with normal affect  Head:    NCAT  Eye:    Visual scanning without deficit  Ear:    Response to auditory stimuli wnl  Lung:    Non-labored breathing on RA noted  Abd:    NTND per patient report  Lymph:    Neg for pitting/non-pitting edema BLE  Vasc:    Pulses palpable, CFT minimally delayed  Neuro:    Light touch sensation intact to all sensory nerve distributions without paresthesias  Derm:    Neg for nodules, lesions or ulcerations  MSK:    R foot - tender at plantar 2nd MPJ at plantar plate.  Lachman shows minimal discomfort and no subluxation.  No other met/intermet/MPJ pain noted.  Bunion, some discomfort at bump without joint pain  Calf:    Neg for redness, swelling or tenderness    Assessment:  68 year old female with R 2nd MPJ capsulitis with plantar  plate injury; R bunion      Plan:  Discussed etiologies, anatomy and options  1.  R 2nd MPJ capsulitis with plantar plate injury  -capsulitis handout for patient information  -comfortable shoe gear; minimize shoeless ambulation  -RICE/NSAID vs tylenol  -OTC insert for arch support  -discussed orthotics, walking boot, PO steroid, PT, imaging as next options to consider    2.  Bunion right foot  -bump pain more of an issue than joint pain, although symptoms are low  -comfortable shoe gear  -padding options briefly discussed  -no indication for surgical discussion based on reported pain levels     Follow up:  3 weeks or sooner with acute issues      Patient's medical history was reviewed today    Body mass index is 35.36 kg/m .  Weight management plan: Patient was referred to their PCP to discuss a diet and exercise plan.        [place] DPM FACFAS FACFAOM  Podiatric Foot & Ankle Surgeon  [facility] ([place], [place])  [phone]

## 2019-08-03 DIAGNOSIS — Z98.84 BARIATRIC SURGERY STATUS: ICD-10-CM

## 2019-08-03 DIAGNOSIS — K91.2 POSTOPERATIVE MALABSORPTION: ICD-10-CM

## 2019-08-05 NOTE — TELEPHONE ENCOUNTER
"Requested Prescriptions   Pending Prescriptions Disp Refills     cyanocobalamin (CYANOCOBALAMIN) 1000 MCG/ML injection [Pharmacy Med Name: Cyanocobalamin Injection Solution 1000 MCG/ML] 2 mL 0     Sig: inject 1 ml subcutaneous every 45 days   Last Written Prescription Date:  05/07/2019  Last Fill Quantity: 2 mL,  # refills: 0   Last office visit: 5/22/2019 with prescribing provider:     Future Office Visit:      Vitamin Supplements (Adult) Protocol Passed - 8/3/2019  7:01 AM        Passed - High dose Vitamin D not ordered        Passed - Recent (12 mo) or future (30 days) visit within the authorizing provider's specialty     Patient had office visit in the last 12 months or has a visit in the next 30 days with authorizing provider or within the authorizing provider's specialty.  See \"Patient Info\" tab in inbasket, or \"Choose Columns\" in Meds & Orders section of the refill encounter.              Passed - Medication is active on med list        LUER LOCK SAFETY SYRINGES 25G X 1\" 3 ML MISC [Pharmacy Med Name: Luer Lock Safety Syringes Miscellaneous 25G X 1\" 3 ML] 2 each 2     Sig: USE FOR B-12 INJECTION: 1 INJECTION EVERY 45 DAYS       There is no refill protocol information for this order        "

## 2019-08-06 RX ORDER — CYANOCOBALAMIN 1000 UG/ML
INJECTION, SOLUTION INTRAMUSCULAR; SUBCUTANEOUS
Qty: 2 ML | Refills: 0 | OUTPATIENT
Start: 2019-08-06

## 2019-08-06 NOTE — TELEPHONE ENCOUNTER
Patient was switched from the B-12 injections to the tablets at her office visit in May.  Attempted to contact patient to see if she wanted to switch back to the injectable.  However, called patient twice, call was answered then disconnected before I could speak.  Contacted the pharmacy.  Rx for tablets was picked up in May.  Previous injectable was on auto fill.  Advised pharmacy to contact us or have patient contact us if she would like to switch to the injectable.  Rxs denied.  Era Vargas RN

## 2019-11-04 ENCOUNTER — HEALTH MAINTENANCE LETTER (OUTPATIENT)
Age: 69
End: 2019-11-04

## 2019-12-09 ENCOUNTER — MYC MEDICAL ADVICE (OUTPATIENT)
Dept: INTERNAL MEDICINE | Facility: CLINIC | Age: 69
End: 2019-12-09

## 2019-12-09 DIAGNOSIS — K21.00 GASTROESOPHAGEAL REFLUX DISEASE WITH ESOPHAGITIS: Primary | ICD-10-CM

## 2019-12-09 RX ORDER — OMEPRAZOLE 20 MG/1
20 TABLET, DELAYED RELEASE ORAL DAILY
Qty: 90 TABLET | Refills: 3 | Status: SHIPPED | OUTPATIENT
Start: 2019-12-09

## 2019-12-09 NOTE — TELEPHONE ENCOUNTER
Mary with [facility] calls, the tablets are not covered by insurance. Requesting approval to change to capsules. Verbal approval given to change to omeprazole 20 mg capsules with same directions.

## 2020-11-16 ENCOUNTER — HEALTH MAINTENANCE LETTER (OUTPATIENT)
Age: 70
End: 2020-11-16

## 2021-09-18 ENCOUNTER — HEALTH MAINTENANCE LETTER (OUTPATIENT)
Age: 71
End: 2021-09-18

## 2022-01-08 ENCOUNTER — HEALTH MAINTENANCE LETTER (OUTPATIENT)
Age: 72
End: 2022-01-08

## 2022-08-19 NOTE — IP AVS SNAPSHOT
MRN:[id number]                      After Visit Summary   6/13/2018    Amy Haq    MRN: [id number]           Thank you!     Thank you for choosing [facility] for your care. Our goal is always to provide you with excellent care. Hearing back from our patients is one way we can continue to improve our services. Please take a few minutes to complete the written survey that you may receive in the mail after you visit. If you would like to speak to someone directly about your visit please contact Patient Relations at [phone]. Thank you!          Patient Information     Date Of Birth          1950        Designated Caregiver       Most Recent Value    Caregiver    Will someone help with your care after discharge? yes    Name of designated caregiver Blanco Haq    Phone number of caregiver [phone]    Caregiver address [facility]      About your hospital stay     You were admitted on:  June 14, 2018 You last received care in the:  [facility]    You were discharged on:  Kaela 15, 2018       Who to Call     For medical emergencies, please call 911.  For non-urgent questions about your medical care, please call your primary care provider or clinic, [phone]  For questions related to your surgery, please call your surgery clinic        Attending Provider     Provider Specialty    Lucie Sharif MD Emergency Medicine    Britney Bird MD Internal Medicine       Primary Care Provider Office Phone # Fax #    MOUNA Watkins [facility] [phone]      After Care Instructions     Activity       Your activity upon discharge: activity as tolerated            Diet       Follow this diet upon discharge: Low fat for 3 days, then advance as tolerated to regular diet.                  Follow-up Appointments     Follow-up and recommended labs and tests        Follow up with primary care provider, Francisca Sainz, within 7 days for  Patient read my chart message on 8/16/22, but did not respond.   Called and left message for her to return call.    hospital follow- up.  The following labs/tests are recommended: cmp/lipase.                  Further instructions from your care team       GENERAL ANESTHESIA OR SEDATION ADULT DISCHARGE INSTRUCTIONS   SPECIAL PRECAUTIONS FOR 24 HOURS AFTER SURGERY    IT IS NOT UNUSUAL TO FEEL LIGHT-HEADED OR FAINT, UP TO 24 HOURS AFTER SURGERY OR WHILE TAKING PAIN MEDICATION.  IF YOU HAVE THESE SYMPTOMS; SIT FOR A FEW MINUTES BEFORE STANDING AND HAVE SOMEONE ASSIST YOU WHEN YOU GET UP TO WALK OR USE THE BATHROOM.    YOU SHOULD REST AND RELAX FOR THE NEXT 24 HOURS AND YOU MUST MAKE ARRANGEMENTS TO HAVE SOMEONE STAY WITH YOU FOR AT LEAST 24 HOURS AFTER YOUR DISCHARGE.  AVOID HAZARDOUS AND STRENUOUS ACTIVITIES.  DO NOT MAKE IMPORTANT DECISIONS FOR 24 HOURS.    DO NOT DRIVE ANY VEHICLE OR OPERATE MECHANICAL EQUIPMENT FOR 24 HOURS FOLLOWING THE END OF YOUR SURGERY.  EVEN THOUGH YOU MAY FEEL NORMAL, YOUR REACTIONS MAY BE AFFECTED BY THE MEDICATION YOU HAVE RECEIVED.    DO NOT DRINK ALCOHOLIC BEVERAGES FOR 24 HOURS FOLLOWING YOUR SURGERY.    DRINK CLEAR LIQUIDS (APPLE JUICE, GINGER ALE, 7-UP, BROTH, ETC.).  PROGRESS TO YOUR REGULAR DIET AS YOU FEEL ABLE.    YOU MAY HAVE A DRY MOUTH, A SORE THROAT, MUSCLES ACHES OR TROUBLE SLEEPING.  THESE SHOULD GO AWAY AFTER 24 HOURS.    CALL YOUR DOCTOR FOR ANY OF THE FOLLOWING:  SIGNS OF INFECTION (FEVER, GROWING TENDERNESS AT THE SURGERY SITE, A LARGE AMOUNT OF DRAINAGE OR BLEEDING, SEVERE PAIN, FOUL-SMELLING DRAINAGE, REDNESS OR SWELLING.    IT HAS BEEN OVER 8 TO 10 HOURS SINCE SURGERY AND YOU ARE STILL NOT ABLE TO URINATE (PASS WATER).     HOME CARE FOLLOWING LAPAROSCOPIC CHOLECYSTECTOMY  BRITTA Vera, GINA Malik, BRITTA Koch. ALEXUS Robison      INCISIONAL CARE:  Replace the bandage over your incisions until all drainage stops, or if more comfortable to have in place.  If present, leave the steri-strips (white paper tapes) in place until they fall off.  If  Dermabond (a type of skin glue) is present, leave in place until it wears/flakes off.     BATHING:  Avoid baths for 1 week after surgery.  Showers are okay.  You may wash your hair at any time.  Gently pat your incisions dry after bathing.    ACTIVITY:  Light Activity -- you may immediately be up and about as tolerated.  Driving -- you may drive when comfortable and off narcotic pain medications.  Light Work -- resume when comfortable off pain medications.  (If you can drive, you probably can work.)  Strenuous Work/Activity -- limit lifting to 20 pounds for 1 week.  Progressively increase with time.  Active Sports (running, biking, etc.) -- cautiously resume after 2 weeks.    DISCOMFORT:  Use pain medications as prescribed by your surgeon.  Take the pain medication with some food, when possible, to minimize side effects.  Intermittent use of ice packs at the incision sites may help during the first 48 hours.  Expect gradual improvement.  You may experience shoulder pain, which is due to the air placed within your abdomen during the procedure.  This is temporary and usually passes within 2 days.    DIET:  Drink plenty of fluids.  While taking pain medications, increase dietary fiber or add a fiber supplementation like Metamucil or Citrucel to help prevent constipation - a possible side effect of pain medications.  If taking Metamucil or Citrucel, take with plenty of fluids as instructed.  It is not uncommon to experience some bowel changes (loose stools or constipation) after surgery.  Your body has to adapt to you no longer having a gall bladder.  To help minimize this side effect, avoid fatty foods for the first week after surgery.  You may then slowly increase the amount of fatty foods in your diet.      NAUSEA:  If nauseated from the anesthetic/pain meds; rest in bed, get up cautiously with assistance, and drink clear liquids (juice, tea, broth).    RETURN APPOINTMENT:  Schedule a follow-up visit 1-3 weeks  post-op (you may do this any time after surgery is scheduled).  Office Phone:  [phone]    CONTACT US IF THE FOLLOWING DEVELOPS:  1.  A fever that is above 101    2.  If there is a large amount of drainage, bleeding, or swelling.  3.  Severe pain that is not relieved by your prescription.  4.  Drainage that is thick, cloudy, yellow, green or white.  5.  Any other questions not answered by  Frequently Asked Questions  sheet.     FREQUENTLY ASKED QUESTIONS AFTER SURGERY  Aayuhs Zheng, BRITTA Newell, GINA Malik, JASMIN Rivas, BRITTA Peña, ALEXUS Hayes  &  ZION Robison      Q:  How should my incision look?    A:  Normally your incision will appear slightly swollen with light redness directly along the incision itself as it heals.  It may feel like a bump or ridge as the healing/scarring happens, and over time (3-4 months) this bump or ridge feeling should slowly go away.  In general, clear or pink watery drainage can be normal at first as your incision heals, but should decrease over time.    Q:  How do I know if my incision is infected?  A:  Look at your incision for signs of infection, like redness around the incision spreading to surrounding skin, or drainage of cloudy or foul-smelling drainage.  If you feel warm, check your temperature to see if you are running a fever.    **If any of these things occur, please notify the nurse at our office.  We may need you to come into the office for an incision check.      Q:  How do I take care of my incision?  A:  If you have a dressing in place - Starting the day after surgery, replace the dressing 1-2 times a day until there is no further drainage from the incision.  At that time, a dressing is no longer needed.  Try to minimize tape on the skin if irritation is occurring at the tape sites.  If you have significant irritation from tape on the skin, please call the office to discuss other method of dressing your incision.    Small pieces of tape called  steri-strips  may be  present directly overlying your incision; these may be removed 10 days after surgery unless otherwise specified by your surgeon.  If these tapes start to loosen at the ends, you may trim them back until they fall off or are removed.    A:  If you had  Dermabond  tissue glue used as a dressing (this causes your incision to look shiny with a clear covering over it) - This type of dressing wears off with time and does not require more dressings over the top unless it is draining around the glue as it wears off.  Do not apply ointments or lotions over the incisions until the glue has completely worn off.    Q:  There is a piece of tape or a sticky  lead  still on my skin.  Can I remove this?  A:  Sometimes the sticky  leads  used for monitoring during surgery or for evaluation in the emergency department are not all removed while you are in the hospital.  These sometimes have a tab or metal dot on them.  You can easily remove these on your own, like taking off a band-aid.  If there is a gel substance under the  lead , simply wipe/clean it off with a washcloth or paper towel.      Q:  What can I do to minimize constipation (very hard stools, or lack of stools)?  A:  Stay well hydrated.  Increase your dietary fiber intake or take a fiber supplement -with plenty of water.  Walk around frequently.  You may consider an over-the-counter stool-softener.  Your Pharmacist can assist you with choosing one that is stocked at your pharmacy.  Constipation is also one of the most common side effects of pain medication.  If you are using pain medication, be pro-active and try to PREVENT problems with constipation by taking the steps above BEFORE constipation becomes a problem.    Q:  What do I do if I need more pain medications?  A:  Call the office to receive refills.  Be aware that certain pain meds cannot be called into a pharmacy and actually require a paper prescription.  A change may be made in your pain med as you progress thru  your recovery period or if you have side effects to certain meds.    --Pain meds are NOT refilled after 5pm on weekdays, and NOT AT ALL on the weekends, so please look ahead to prevent problems.                  Q:  Why am I having a hard time sleeping now that I am at home?  A:  Many medications you receive while you are in the hospital can impact your sleep for a number of days after your surgery/hospitalization.  Decreased level of activity and naps during the day may also make sleeping at night difficult.  Try to minimize day-time naps, and get up frequently during the day to walk around your home during your recovery time.  Sleep aides may be of some help, but are not recommended for long-term use.      Q:  I am having some back discomfort.  What should I do?  A:  This may be related to certain positioning that was required for your surgery, extended periods of time in bed, or other changes in your overall activity level.  You may try ice, heat, acetaminophen, or ibuprofen to treat this temporarily.  Note that many pain medications have acetaminophen in them and would state this on the prescription bottle.  Be sure not to exceed the maximum of 4000mg per day of acetaminophen.     **If the pain you are having does not resolve, is severe, or is a flare of back pain you have had on other occasions prior to surgery, please contact your primary physician for further recommendations or for an appointment to be examined at their office.    Q:  Why am I having headaches?  A:  Headaches can be caused by many things:  caffeine withdrawal, use of pain meds, dehydration, high blood pressure, lack of sleep, over-activity/exhaustion, flare-up of usual migraine headaches.  If you feel this is related to muscle tension (a band-like feeling around the head, or a pressure at the low-back of the head) you may try ice or heat to this area.  You may need to drink more fluids (try electrolyte drink like Gatorade), rest, or take your  usual migraine medications.   **If your headaches do not resolve, worsen, are accompanied by other symptoms, or if your blood pressure is high, please call your primary physician for recommendation and/or examination.    Q:  I am unable to urinate.  What do I do?  A:  A small percentage of people can have difficulty urinating initially after surgery.  This includes being able to urinate only a very small amount at a time and feeling discomfort or pressure in the very low abdomen.  This is called  urinary retention , and is actually an urgent situation.  Proceed to your nearest Emergency department for evaluation (not an Urgent Care Center).  Sometimes the bladder does not work correctly after certain medications you receive during surgery, or related to certain procedures.  You may need to have a catheter placed until your bladder recovers.  When planning to go to an Emergency department, it may help to call the ER to let them know you are coming in for this problem after a surgery.  This may help you get in quicker to be evaluated.  **If you have symptoms of a urinary tract infection, please contact your primary physician for the proper evaluation and treatment.      If you have other questions, please call the office Monday thru Friday between 8am and 5pm to discuss with the nurse or physician assistant.  #[phone]    There is a surgeon ON CALL on weekday evenings and over the weekend in case of urgent need only, and may be contacted at the same number.    If you are having an emergency, call 911 or proceed to your nearest emergency department.    You received a pain pill (Oxycodone 5 mg) at 5:00 pm                                  Pending Results     Date and Time Order Name Status Description    6/15/2018 1509 Surgical pathology exam In process     6/15/2018 1127 XR Surgery LAURA Fluoro L/T 5 Min w Stills In process     6/15/2018 0000 EBV Capsid Antibody IgG In process             Statement of Approval      "Ordered          06/15/18 2992  I have reviewed and agree with all the recommendations and orders detailed in this document.  EFFECTIVE NOW     Approved and electronically signed by:  Melissa Macdonald MD             Admission Information     Date & Time Provider Department Dept. Phone    6/13/2018 Britney Bird MD [facility] [phone]      Your Vitals Were     Blood Pressure Pulse Temperature Respirations Height Weight    155/75 66 96.7  F (35.9  C) (Temporal) 16 1.676 m (5' 6\") 95.3 kg (210 lb)    Pulse Oximetry BMI (Body Mass Index)                98% 33.89 kg/m2          MyChart Information     [employer] gives you secure access to your electronic health record. If you see a primary care provider, you can also send messages to your care team and make appointments. If you have questions, please call your primary care clinic.  If you do not have a primary care provider, please call [phone] and they will assist you.        Care EveryWhere ID     This is your Care EveryWhere ID. This could be used by other organizations to access your [facility] medical records  [id number]        Equal Access to Services     HONORIO BELL : Hadfadia Arellano, gregory alex, iris wiggins. So [facility] [phone].    ATENCIÓN: Si habla español, tiene a luis disposición servicios gratuitos de asistencia lingüística. [facility] [phone].    We comply with applicable federal civil rights laws and [place] laws. We do not discriminate on the basis of race, color, national origin, age, disability, sex, sexual orientation, or gender identity.               Review of your medicines      START taking        Dose / Directions    oxyCODONE IR 5 MG tablet   Commonly known as:  ROXICODONE   Used for:  Gallstone pancreatitis        Dose:  5-10 mg   Take 1-2 tablets (5-10 mg) by mouth every 3 hours as needed for pain or other (Moderate to Severe)   Quantity: " " 12 tablet   Refills:  0         CONTINUE these medicines which have NOT CHANGED        Dose / Directions    allopurinol 100 MG tablet   Commonly known as:  ZYLOPRIM   Used for:  Gout without tophus        Dose:  200 mg   Take 2 tablets (200 mg) by mouth daily   Quantity:  180 tablet   Refills:  3       CALCIUM + D PO        Dose:  1 tablet   Take 1 tablet by mouth 2 times daily 600mg + vitamin D   Refills:  0       cetirizine 10 MG tablet   Commonly known as:  zyrTEC        Dose:  10 mg   Take 10 mg by mouth daily.   Refills:  0       cyanocobalamin 1000 MCG/ML injection   Commonly known as:  VITAMIN B12   Used for:  Postoperative malabsorption        inject 1 ml subcutaneous every 45 days   Quantity:  2 mL   Refills:  3       * ICAPS AREDS FORMULA PO   Used for:  Bariatric surgery status, Obesity (BMI 30.0-34.9)        Dose:  1 capsule   Take 1 capsule by mouth daily   Refills:  0       * OCUVITE PRESERVISION PO        Dose:  1 tablet   Take 1 tablet by mouth 2 times daily   Refills:  0       levothyroxine 100 MCG tablet   Commonly known as:  SYNTHROID/LEVOTHROID   Used for:  Acquired hypothyroidism        Dose:  100 mcg   Take 1 tablet (100 mcg) by mouth daily   Quantity:  90 tablet   Refills:  3       prednisolon-gatiflox-bromfenac (pt own, no charge) 1-0.5-0.075 % opthalmic solution        Dose:  1 drop   Place 1 drop into the right eye 3 times daily Pt to stop after current bottle used up   Refills:  0       prednisoLONE acetate 1 % ophthalmic susp   Commonly known as:  PRED FORTE        Dose:  1 drop   Place 1 drop into the right eye 3 times daily   Refills:  0       ranitidine 150 MG tablet   Commonly known as:  ZANTAC   Used for:  Bariatric surgery status, Gastroesophageal reflux disease without esophagitis        Dose:  150 mg   Take 1 tablet (150 mg) by mouth 2 times daily   Quantity:  180 tablet   Refills:  3       syringe/needle (disp) 25G X 1\" 3 ML Misc   Used for:  Bariatric surgery status        Use " for B-12 injection   Quantity:  3 each   Refills:  3       VITAMIN D (CHOLECALCIFEROL) PO   Used for:  Bariatric surgery status, Obesity, unspecified        Dose:  2000 Units   Take 2,000 Units by mouth daily as needed   Refills:  0       * Notice:  This list has 2 medication(s) that are the same as other medications prescribed for you. Read the directions carefully, and ask your doctor or other care provider to review them with you.         Where to get your medicines      Some of these will need a paper prescription and others can be bought over the counter. Ask your nurse if you have questions.     Bring a paper prescription for each of these medications     oxyCODONE IR 5 MG tablet                Protect others around you: Learn how to safely use, store and throw away your medicines at www.disposemymeds.org.        Information about OPIOIDS     PRESCRIPTION OPIOIDS: WHAT YOU NEED TO KNOW   We gave you an opioid (narcotic) pain medicine. It is important to manage your pain, but opioids are not always the best choice. You should first try all the other options your care team gave you. Take this medicine for as short a time (and as few doses) as possible.     These medicines have risks:    DO NOT drive when on new or higher doses of pain medicine. These medicines can affect your alertness and reaction times, and you could be arrested for driving under the influence (DUI). If you need to use opioids long-term, talk to your care team about driving.    DO NOT operate heave machinery    DO NOT do any other dangerous activities while taking these medicines.     DO NOT drink any alcohol while taking these medicines.      If the opioid prescribed includes acetaminophen, DO NOT take with any other medicines that contain acetaminophen. Read all labels carefully. Look for the word  acetaminophen  or  Tylenol.  Ask your pharmacist if you have questions or are unsure.    You can get addicted to pain medicines, especially if  you have a history of addiction (chemical, alcohol or substance dependence). Talk to your care team about ways to reduce this risk.    Store your pills in a secure place, locked if possible. We will not replace any lost or stolen medicine. If you don t finish your medicine, please throw away (dispose) as directed by your pharmacist. The [place] Pollution Control Agency has more information about safe disposal: https://www.pca.[facility].[place].us/living-green/managing-unwanted-medications.     All opioids tend to cause constipation. Drink plenty of water and eat foods that have a lot of fiber, such as fruits, vegetables, prune juice, apple juice and high-fiber cereal. Take a laxative (Miralax, milk of magnesia, Colace, Senna) if you don t move your bowels at least every other day.              Medication List: This is a list of all your medications and when to take them. Check marks below indicate your daily home schedule. Keep this list as a reference.      Medications           Morning Afternoon Evening Bedtime As Needed    allopurinol 100 MG tablet   Commonly known as:  ZYLOPRIM   Take 2 tablets (200 mg) by mouth daily   Last time this was given:  200 mg on 6/15/2018  8:33 AM                                CALCIUM + D PO   Take 1 tablet by mouth 2 times daily 600mg + vitamin D                                cetirizine 10 MG tablet   Commonly known as:  zyrTEC   Take 10 mg by mouth daily.                                cyanocobalamin 1000 MCG/ML injection   Commonly known as:  VITAMIN B12   inject 1 ml subcutaneous every 45 days                                * ICAPS AREDS FORMULA PO   Take 1 capsule by mouth daily                                * OCUVITE PRESERVISION PO   Take 1 tablet by mouth 2 times daily                                levothyroxine 100 MCG tablet   Commonly known as:  SYNTHROID/LEVOTHROID   Take 1 tablet (100 mcg) by mouth daily   Last time this was given:  100 mcg on 6/15/2018  8:33 AM               "                  oxyCODONE IR 5 MG tablet   Commonly known as:  ROXICODONE   Take 1-2 tablets (5-10 mg) by mouth every 3 hours as needed for pain or other (Moderate to Severe)   Last time this was given:  5 mg on 6/15/2018  5:03 PM                                prednisolon-gatiflox-bromfenac (pt own, no charge) 1-0.5-0.075 % opthalmic solution   Place 1 drop into the right eye 3 times daily Pt to stop after current bottle used up   Last time this was given:  1 drop on 6/15/2018  8:33 AM                                prednisoLONE acetate 1 % ophthalmic susp   Commonly known as:  PRED FORTE   Place 1 drop into the right eye 3 times daily   Last time this was given:  1 drop on 6/15/2018  8:33 AM                                ranitidine 150 MG tablet   Commonly known as:  ZANTAC   Take 1 tablet (150 mg) by mouth 2 times daily   Last time this was given:  150 mg on 6/15/2018  8:33 AM                                syringe/needle (disp) 25G X 1\" 3 ML Misc   Use for B-12 injection                                VITAMIN D (CHOLECALCIFEROL) PO   Take 2,000 Units by mouth daily as needed                                * Notice:  This list has 2 medication(s) that are the same as other medications prescribed for you. Read the directions carefully, and ask your doctor or other care provider to review them with you.      "

## 2022-11-20 ENCOUNTER — HEALTH MAINTENANCE LETTER (OUTPATIENT)
Age: 72
End: 2022-11-20

## 2023-04-15 ENCOUNTER — HEALTH MAINTENANCE LETTER (OUTPATIENT)
Age: 73
End: 2023-04-15

## (undated) DEVICE — GLOVE PROTEXIS POWDER FREE SMT 8.5 2D72PT85X

## (undated) DEVICE — RAD RX ISOVUE 300 (50ML) 61% IOPAMIDOL CHARGE PER ML

## (undated) DEVICE — SYR 30ML LL W/O NDL 302832

## (undated) DEVICE — DRAPE C-ARM 60X42" 1013

## (undated) DEVICE — EYE SHIELD PLASTIC

## (undated) DEVICE — SU DERMABOND MINI DHVM12

## (undated) DEVICE — Device

## (undated) DEVICE — LINEN POUCH DBL 5427

## (undated) DEVICE — SOL NACL 0.9% IRRIG 1000ML BOTTLE 2F7124

## (undated) DEVICE — GLOVE PROTEXIS MICRO 7.0  2D73PM70

## (undated) DEVICE — LINEN FULL SHEET 5511

## (undated) DEVICE — PACK CATARACT CUSTOM SO DALE SEY32CTFCX

## (undated) DEVICE — BAG CLEAR TRASH 1.3M 39X33" P4040C

## (undated) DEVICE — EYE PACK BVI READYPAK KIT #2

## (undated) DEVICE — LINEN TOWEL PACK X10 5473

## (undated) DEVICE — EYE TIP IRRIGATION & ASPIRATION POLYMER 35D BENT 8065751511

## (undated) DEVICE — EYE KNIFE SLIT XSTAR VISITEC 2.5MM 45DEG DBL BEVEL 370825

## (undated) DEVICE — SU VICRYL 4-0 PS-2 18" UND J496H

## (undated) DEVICE — BLADE CLIPPER 3M 9670

## (undated) DEVICE — SOL NACL 0.9% IRRIG 3000ML BAG 2B7477

## (undated) DEVICE — ESU CORD MONOPOLAR 10'  E0510

## (undated) DEVICE — SU VICRYL 0 UR-6 27" J603H

## (undated) DEVICE — SUCTION CANISTER MEDIVAC LINER 3000ML W/LID 65651-530

## (undated) DEVICE — ENDO CANNULA 05MM VERSAONE UNIVERSAL UNVCA5STF

## (undated) DEVICE — GLOVE PROTEXIS POWDER FREE 7.5 ORTHOPEDIC 2D73ET75

## (undated) DEVICE — ESU ELEC BLADE 2.75" COATED/INSULATED E1455

## (undated) DEVICE — GLOVE PROTEXIS BLUE W/NEU-THERA 8.0  2D73EB80

## (undated) DEVICE — ESU GROUND PAD ADULT W/CORD E7507

## (undated) DEVICE — DECANTER VIAL 2006S

## (undated) DEVICE — BLADE KNIFE SURG 11 371111

## (undated) DEVICE — EYE PACK CUSTOM ANTERIOR 30DEG TIP CENTURION PPK6682-04

## (undated) DEVICE — ENDO DISSECTOR KITTNER 05MM 28-0804

## (undated) DEVICE — ENDO TROCAR 05MM VERSAONE BLADELESS W/STD FIX CAN NONB5STF

## (undated) DEVICE — DECANTER BAG 2002S

## (undated) DEVICE — EYE SOL BSS 500ML

## (undated) DEVICE — LINEN TOWEL PACK X5 5464

## (undated) DEVICE — EYE CANN IRR 25GA HYDRODISSECTING 585037

## (undated) DEVICE — CLIP APPLIER ENDO 05MM MED/LG 176630

## (undated) DEVICE — ENDO POUCH GOLD 10MM ECATCH 173050G

## (undated) DEVICE — ENDO TROCAR BLUNT 100MM W/THRD ANCHOR BLUNTPORT BPT12STS

## (undated) DEVICE — SU ETHILON 10-0 CS160-6 12" 9000G

## (undated) DEVICE — SUCTION IRR STRYKERFLOW II W/TIP 250-070-520

## (undated) DEVICE — SPECIMEN CONTAINER 4OZ

## (undated) DEVICE — TAPE MICROPORE 2"X1.5YD 1530S-2

## (undated) DEVICE — CATH CHOLANGIOGRAM KUMAR CC-019

## (undated) DEVICE — SYR 50ML LL W/O NDL 309653

## (undated) DEVICE — LINEN HALF SHEET 5512

## (undated) DEVICE — SOL NACL 0.9% INJ 250ML BAG 2B1322Q

## (undated) DEVICE — GLOVE PROTEXIS MICRO 6.0  2D73PM60

## (undated) DEVICE — PREP CHLORAPREP 26ML TINTED ORANGE  260815

## (undated) RX ORDER — CEFAZOLIN SODIUM 2 G/100ML
INJECTION, SOLUTION INTRAVENOUS
Status: DISPENSED
Start: 2018-06-15

## (undated) RX ORDER — OXYCODONE HYDROCHLORIDE 5 MG/1
TABLET ORAL
Status: DISPENSED
Start: 2018-06-15

## (undated) RX ORDER — LIDOCAINE HYDROCHLORIDE 10 MG/ML
INJECTION, SOLUTION EPIDURAL; INFILTRATION; INTRACAUDAL; PERINEURAL
Status: DISPENSED
Start: 2018-06-15

## (undated) RX ORDER — DEXAMETHASONE SODIUM PHOSPHATE 4 MG/ML
INJECTION, SOLUTION INTRA-ARTICULAR; INTRALESIONAL; INTRAMUSCULAR; INTRAVENOUS; SOFT TISSUE
Status: DISPENSED
Start: 2018-06-15

## (undated) RX ORDER — ONDANSETRON 2 MG/ML
INJECTION INTRAMUSCULAR; INTRAVENOUS
Status: DISPENSED
Start: 2018-06-15

## (undated) RX ORDER — LABETALOL HYDROCHLORIDE 5 MG/ML
INJECTION, SOLUTION INTRAVENOUS
Status: DISPENSED
Start: 2018-06-15

## (undated) RX ORDER — BUPIVACAINE HYDROCHLORIDE 5 MG/ML
INJECTION, SOLUTION EPIDURAL; INTRACAUDAL
Status: DISPENSED
Start: 2018-06-15

## (undated) RX ORDER — FENTANYL CITRATE 50 UG/ML
INJECTION, SOLUTION INTRAMUSCULAR; INTRAVENOUS
Status: DISPENSED
Start: 2018-06-15

## (undated) RX ORDER — HYDRALAZINE HYDROCHLORIDE 20 MG/ML
INJECTION INTRAMUSCULAR; INTRAVENOUS
Status: DISPENSED
Start: 2018-06-15

## (undated) RX ORDER — LIDOCAINE HYDROCHLORIDE 10 MG/ML
INJECTION, SOLUTION EPIDURAL; INFILTRATION; INTRACAUDAL; PERINEURAL
Status: DISPENSED
Start: 2018-05-29

## (undated) RX ORDER — GLYCOPYRROLATE 0.2 MG/ML
INJECTION INTRAMUSCULAR; INTRAVENOUS
Status: DISPENSED
Start: 2018-06-15

## (undated) RX ORDER — NEOSTIGMINE METHYLSULFATE 1 MG/ML
VIAL (ML) INJECTION
Status: DISPENSED
Start: 2018-06-15

## (undated) RX ORDER — PROPOFOL 10 MG/ML
INJECTION, EMULSION INTRAVENOUS
Status: DISPENSED
Start: 2018-06-15